# Patient Record
Sex: MALE | Race: BLACK OR AFRICAN AMERICAN | NOT HISPANIC OR LATINO | Employment: OTHER | URBAN - METROPOLITAN AREA
[De-identification: names, ages, dates, MRNs, and addresses within clinical notes are randomized per-mention and may not be internally consistent; named-entity substitution may affect disease eponyms.]

---

## 2017-04-23 ENCOUNTER — HOSPITAL ENCOUNTER (EMERGENCY)
Facility: HOSPITAL | Age: 77
Discharge: HOME | End: 2017-04-23
Payer: OTHER GOVERNMENT

## 2017-04-23 VITALS
WEIGHT: 160 LBS | DIASTOLIC BLOOD PRESSURE: 88 MMHG | TEMPERATURE: 98 F | HEART RATE: 75 BPM | SYSTOLIC BLOOD PRESSURE: 180 MMHG | BODY MASS INDEX: 23.63 KG/M2 | OXYGEN SATURATION: 98 %

## 2017-04-23 DIAGNOSIS — K59.00 CONSTIPATION: ICD-10-CM

## 2017-04-23 DIAGNOSIS — R33.9 URINARY RETENTION: Primary | ICD-10-CM

## 2017-04-23 LAB
ALBUMIN SERPL BCP-MCNC: 3.2 G/DL
ALP SERPL-CCNC: 59 U/L
ALT SERPL W/O P-5'-P-CCNC: 10 U/L
ANION GAP SERPL CALC-SCNC: 9 MMOL/L
AST SERPL-CCNC: 28 U/L
BACTERIA #/AREA URNS HPF: ABNORMAL /HPF
BASOPHILS # BLD AUTO: 0.08 K/UL
BASOPHILS NFR BLD: 1 %
BILIRUB SERPL-MCNC: 0.6 MG/DL
BILIRUB UR QL STRIP: NEGATIVE
BUN SERPL-MCNC: 24 MG/DL
CALCIUM SERPL-MCNC: 9.4 MG/DL
CHLORIDE SERPL-SCNC: 106 MMOL/L
CLARITY UR: CLEAR
CO2 SERPL-SCNC: 27 MMOL/L
COLOR UR: YELLOW
CREAT SERPL-MCNC: 1.8 MG/DL
DIFFERENTIAL METHOD: ABNORMAL
EOSINOPHIL # BLD AUTO: 0.2 K/UL
EOSINOPHIL NFR BLD: 1.8 %
ERYTHROCYTE [DISTWIDTH] IN BLOOD BY AUTOMATED COUNT: 16.5 %
EST. GFR  (AFRICAN AMERICAN): 41 ML/MIN/1.73 M^2
EST. GFR  (NON AFRICAN AMERICAN): 36 ML/MIN/1.73 M^2
GLUCOSE SERPL-MCNC: 110 MG/DL
GLUCOSE UR QL STRIP: NEGATIVE
HCT VFR BLD AUTO: 31.3 %
HGB BLD-MCNC: 9.9 G/DL
HGB UR QL STRIP: ABNORMAL
HYALINE CASTS #/AREA URNS LPF: 0 /LPF
KETONES UR QL STRIP: NEGATIVE
LEUKOCYTE ESTERASE UR QL STRIP: NEGATIVE
LYMPHOCYTES # BLD AUTO: 1.7 K/UL
LYMPHOCYTES NFR BLD: 20.5 %
MCH RBC QN AUTO: 25.9 PG
MCHC RBC AUTO-ENTMCNC: 31.6 %
MCV RBC AUTO: 82 FL
MICROSCOPIC COMMENT: ABNORMAL
MONOCYTES # BLD AUTO: 0.9 K/UL
MONOCYTES NFR BLD: 10.3 %
NEUTROPHILS # BLD AUTO: 5.5 K/UL
NEUTROPHILS NFR BLD: 66.4 %
NITRITE UR QL STRIP: NEGATIVE
PH UR STRIP: 7 [PH] (ref 5–8)
PLATELET # BLD AUTO: 242 K/UL
PMV BLD AUTO: 10.2 FL
POTASSIUM SERPL-SCNC: 4.6 MMOL/L
PROT SERPL-MCNC: 8.8 G/DL
PROT UR QL STRIP: ABNORMAL
RBC # BLD AUTO: 3.82 M/UL
RBC #/AREA URNS HPF: 6 /HPF (ref 0–4)
SODIUM SERPL-SCNC: 142 MMOL/L
SP GR UR STRIP: 1.02 (ref 1–1.03)
URN SPEC COLLECT METH UR: ABNORMAL
UROBILINOGEN UR STRIP-ACNC: NEGATIVE EU/DL
WBC # BLD AUTO: 8.25 K/UL
WBC #/AREA URNS HPF: 2 /HPF (ref 0–5)

## 2017-04-23 PROCEDURE — 81000 URINALYSIS NONAUTO W/SCOPE: CPT

## 2017-04-23 PROCEDURE — 99284 EMERGENCY DEPT VISIT MOD MDM: CPT

## 2017-04-23 PROCEDURE — 85025 COMPLETE CBC W/AUTO DIFF WBC: CPT

## 2017-04-23 PROCEDURE — 80053 COMPREHEN METABOLIC PANEL: CPT

## 2017-04-23 RX ORDER — NITROFURANTOIN 25; 75 MG/1; MG/1
100 CAPSULE ORAL 2 TIMES DAILY
Qty: 10 CAPSULE | Refills: 0 | Status: SHIPPED | OUTPATIENT
Start: 2017-04-23 | End: 2018-02-18 | Stop reason: ALTCHOICE

## 2017-04-23 RX ORDER — TAMSULOSIN HYDROCHLORIDE 0.4 MG/1
0.4 CAPSULE ORAL DAILY
Qty: 10 CAPSULE | Refills: 0 | Status: SHIPPED | OUTPATIENT
Start: 2017-04-23 | End: 2018-02-22 | Stop reason: SDUPTHER

## 2017-04-23 NOTE — ED AVS SNAPSHOT
[facility]  [address] [facility] [id number]               Ryland Ortez Jr.   2017 12:41 PM   ED    Description:  Male : 1940   Department:  [facility]            Your Care was Coordinated By:     Provider Role From To    SANDRA Ogden Physician Assistant [id number] --      Reason for Visit     Urinary Retention           Diagnoses this Visit        Comments    Urinary retention    -  Primary     Constipation           ED Disposition     ED Disposition Condition Comment    Discharge             To Do List           Follow-up Information     Follow up with urologist. Go in 1 day(s).       These Medications        Disp Refills Start End    nitrofurantoin, macrocrystal-monohydrate, (MACROBID) 100 MG capsule 10 capsule 0 2017     Take 1 capsule (100 mg total) by mouth 2 (two) times daily. - Oral    Pharmacy: [facility] [id number] [place] [address] [address] AT [facility] Ph #: [phone]       tamsulosin (FLOMAX) 0.4 mg Cp24 10 capsule 0 2017    Take 1 capsule (0.4 mg total) by mouth once daily. - Oral    [facility] [place] [place] [id number] [place] [address] [address] AT [facility] Ph #: [phone]         [facility]     [facility] Care [facility] - 24/ Assistance  Unless otherwise directed by your provider, please contact [facility], our nurse care line that is available for 24/ assistance.     Registered nurses in the [facility] provide: appointment scheduling, clinical advisement, health education, and other advisory services.  Call: [phone] (toll free)               Medications           Message regarding Medications     Verify the changes and/or additions to your medication regime listed below are the same as discussed with your clinician today.  If any of these changes or additions are incorrect,  please notify your healthcare provider.        START taking these NEW medications        Refills    nitrofurantoin, macrocrystal-monohydrate, (MACROBID) 100 MG capsule 0    Sig: Take 1 capsule (100 mg total) by mouth 2 (two) times daily.    Class: Print    Route: Oral    tamsulosin (FLOMAX) 0.4 mg Cp24 0    Sig: Take 1 capsule (0.4 mg total) by mouth once daily.    Class: Normal    Route: Oral           Verify that the below list of medications is an accurate representation of the medications you are currently taking.  If none reported, the list may be blank. If incorrect, please contact your healthcare provider. Carry this list with you in case of emergency.           Current Medications     adalimumab (HUMIRA) 40 mg/0.8 mL injection Inject 0.8 mLs (40 mg total) into the skin every 7 days.    amlodipine (NORVASC) 5 MG tablet Take 5 mg by mouth once daily.    atorvastatin (LIPITOR) 10 MG tablet Take 1 tablet (10 mg total) by mouth once daily.    ferrous sulfate 325 mg (65 mg iron) Tab tablet Take 1 tablet (325 mg total) by mouth once daily.    glipiZIDE (GLUCOTROL) 5 MG tablet Take 5 mg by mouth daily with dinner or evening meal. Pt takes 2.5 mg at night    isosorbide dinitrate (ISORDIL) 40 MG Tab Take 20 mg by mouth 3 (three) times daily.    lisinopril (PRINIVIL,ZESTRIL) 40 MG tablet Take 40 mg by mouth once daily.    mesalamine (CANASA) 1000 MG Supp Place 500 mg rectally 2 (two) times daily.    metoprolol succinate (TOPROL-XL) 25 MG 24 hr tablet Take 0.5 tablets (12.5 mg total) by mouth once daily.    nitrofurantoin, macrocrystal-monohydrate, (MACROBID) 100 MG capsule Take 1 capsule (100 mg total) by mouth 2 (two) times daily.    oxybutynin (DITROPAN XL) 15 MG TR24 Take 10 mg by mouth once daily.     oxycodone-acetaminophen (PERCOCET) 5-325 mg per tablet Take 1 tablet by mouth every 4 (four) hours as needed for Pain.    pantoprazole (PROTONIX) 40 MG tablet Take 1 tablet (40 mg total) by mouth once daily.     tamsulosin (FLOMAX) 0.4 mg Cp24 Take 1 capsule (0.4 mg total) by mouth once daily.           Clinical Reference Information           Your Vitals Were     BP Pulse Temp Weight SpO2 BMI    180/88 (BP Location: Right arm, Patient Position: Lying, BP Method: Automatic) 75 98 °F (36.7 °C) (Oral) 72.6 kg (160 lb) 98% 23.63 kg/m2      Allergies as of 4/23/2017     No Known Allergies      Immunizations Administered on Date of Encounter - 4/23/2017     None      ED Micro, Lab, POCT     Start Ordered       Status Ordering Provider    04/23/17 1301 04/23/17 1300  Urinalysis Catheterized  STAT      Final result     04/23/17 1300 04/23/17 1300  CBC auto differential  STAT      Final result     04/23/17 1300 04/23/17 1300  Comprehensive metabolic panel  STAT      Final result     04/23/17 1300 04/23/17 1300  Urinalysis Microscopic  Once      Final result       ED Imaging Orders     Start Ordered       Status Ordering Provider    04/23/17 1311 04/23/17 1310  X-Ray Abdomen Flat And Erect  1 time imaging      Final result         Discharge Instructions           Urinary Retention (Male)  Urinary retention is the medical term for difficulty or inability to pass urine, even though your bladder is full.  Causes  The most common cause of urinary retention in men is the bladder outlet being blocked. This can be due to an enlarged prostate gland or a bladder infection. Certain medicines can also cause this problem. This condition is more likely to occur as men get older.    This condition is treated by insertion of a catheter into the bladder to drain the urine. This provides immediate relief. The catheter may need to remain in place for a few days to prevent a recurrence. The catheter has a balloon on the tip which was inflated after insertion. This prevents the catheter from falling out.  Symptoms  Common symptoms of urinary retention include:  · Pain (not experienced by everyone)  · Frequent urination  · Feeling that the bladder is  still full after urinating  · Incontinence (not being able to control the release of urine)  · Swollen abdomen  Treatment  This condition is treated by inserting a tube (catheter) into the bladder to drain the urine. This provides immediate relief. The catheter may need to stay in place for a few days. The catheter has a balloon on the tip, which is inflated after insertion. This prevents the catheter from falling out.  Home care  · If you were given antibiotics, take them until they are used up, or your healthcare provider tells you to stop. It is important to finish the antibiotics even though you feel better. This is to make sure your infection has cleared.  · If a catheter was left in place, it is important to keep bacteria from getting into the collection bag. Do not disconnect the catheter from the collection bag.  · Use a leg band to secure the drainage tube, so it does not pull on the catheter. Drain the collection bag when it becomes full using the drain spout at the bottom of the bag.  · Do not pull on or try to remove your catheter. This will injure your urethra. The catheter must be removed by a healthcare provider.  Follow-up care  Follow up with your healthcare provider, or as advised.  If a catheter was left in place, it can usually be removed within 3 to 7 days. Some conditions require the catheter to stay in longer. Your healthcare provider will tell you when to return to have the catheter removed.  When to seek medical advice  Call your healthcare provider right away if any of these occur:  · Fever of 100.4ºF (38ºC) or higher, or as directed by your healthcare provider  · Bladder or lower-abdominal pain or fullness  · Abdominal swelling, nausea, vomiting, or back pain  · Blood or urine leakage around the catheter  · Bloody urine coming from the catheter (if a new symptom)  · Weakness, dizziness, or fainting  · Confusion or change in usual level of alertness  · If a catheter was left in place,  return if:  ¨ Catheter falls out  ¨ Catheter stops draining for 6 hours  Date Last Reviewed: 7/26/2015  © 5774-2290 [employer]. [address] [place] [address], [place] [address]. All rights reserved. This information is not intended as a substitute for professional medical care. Always follow your healthcare professional's instructions.          Ramirez Catheter Care    A Ramirez catheter is a rubber tube that is placed through the urethra (opening where urine comes out) and into the bladder. This helps drain urine from the bladder. There is a small balloon on the end of the tube that is inflated after insertion. This keeps the catheter from sliding out of the bladder.  A Ramirez catheter is used to treat urinary retention (unable to pass urine). It is also used when there is incontinence (loss of bladder control).  Home care  · Finish taking any prescribed antibiotic even if you are feeling better before then.  · It is important to keep bacteria from getting into the collection bag. Do not disconnect the catheter from the collection bag.  · Use a leg band to secure the drainage tube, so it does not pull on the catheter. Drain the collection bag when it becomes full using the drain spout at the bottom of the bag.  · Do not try to pull or remove your catheter. This will injure your urethra. It must be removed by your healthcare provider or nurse.  Follow-up care  Follow up with your healthcare provider as advised for repeat urine testing and catheter removal or replacement.  When to seek medical advice  Call your healthcare provider right away if any of these occur:  · Fever of 100.4ºF (38ºC) or higher, or as directed by your healthcare provider  · Bladder pain or fullness  · Abdominal swelling, nausea or vomiting, or back pain  · Blood or urine leakage around the catheter  · Bloody urine coming from the catheter (if a new symptom)  · Catheter falls out  · Catheter stops draining for 6 hours  · Weakness, dizziness,  or fainting  Date Last Reviewed: 10/1/2016  © 9069-3329 [employer]. [address] [place] [address], [place] [address]. All rights reserved. This information is not intended as a substitute for professional medical care. Always follow your healthcare professional's instructions.          [facility] Sign-Up     Activating your [facility] account is as easy as 1-2-3!     1) Visit [email or web address], select Sign Up Now, enter this activation code and your date of birth, then select Next.  [email or web address]  Expires: 6/7/2017  4:14 PM      2) Create a username and password to use when you visit [facility] in the future and select a security question in case you lose your password and select Next.    3) Enter your e-mail address and click Sign Up!    Additional Information  If you have questions, please e-mail [email or web address] or call [phone] to talk to our [facility] staff. Remember, [facility] is NOT to be used for urgent needs. For medical emergencies, dial 911.         Smoking Cessation     If you would like to quit smoking:   You may be eligible for free services if you are a [place] resident and started smoking cigarettes before September 1, 1988.  Call the Smoking Cessation Trust (SCT) toll free at (231) 761-4524 or (349) 039-6895.   Call 1-800-QUIT-NOW if you do not meet the above criteria.   Contact us via email: [email or web address]   View our website for more information: [email or web address]         [facility] complies with applicable Federal civil rights laws and does not discriminate on the basis of race, color, national origin, age, disability, or sex.        Language Assistance Services     ATTENTION: Language assistance services are available, free of charge. Please call [phone].      ATENCIÓN: Si habla español, tiene a neal disposición servicios gratuitos de asistencia lingüística. Llame al [phone].     CHÚ Ý: N?u b?n nói Ti?ng Vi?t, có các d?ch v? h?  tr? venessa ribera? mi?n phí dành cho b?n. G?i s? [phone].

## 2017-04-23 NOTE — ED PROVIDER NOTES
Encounter Date: 4/23/2017       History     Chief Complaint   Patient presents with    Urinary Retention     post colonoscopy     Review of patient's allergies indicates:  No Known Allergies  HPI Comments: Pt states that he has been unable to urinate x 12 hours.  Pt denies back pain, saddle anesthesia, focal weakness.    Patient is a 77 y.o. male presenting with the following complaint: male genitourinary complaint. The history is provided by the patient.   Male  Problem   Primary symptoms include no dysuria, no penile pain. Primary symptoms comment: urinary retention. This is a recurrent problem. The current episode started today. The problem occurs occasionally. The problem has been unchanged. The symptoms occur spontaneously. Associated symptoms include abdominal pain and constipation. Pertinent negatives include no anorexia, no diaphoresis, no nausea, no vomiting, no abdominal swelling, no frequency and no diarrhea.     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    Overactive bladder     Peripheral vascular disease 1/20/2016    Prediabetes     PVD (peripheral vascular disease) 7/8/2013    Stroke      Past Surgical History:   Procedure Laterality Date    ARTERIAL ANEURYSM REPAIR      AAA repair    CAROTID ARTERY ANGIOPLASTY      COLONOSCOPY N/A 10/9/2015    Procedure: COLONOSCOPY;  Surgeon: Dedrick Brownlee MD;  Location: [facility];  Service: Endoscopy;  Laterality: N/A;    COLONOSCOPY N/A 11/24/2015    Procedure: COLONOSCOPY;  Surgeon: Dedrick Borwnlee MD;  Location: [facility];   Service: Endoscopy;  Laterality: N/A;    CORONARY ANGIOPLASTY WITH STENT PLACEMENT      2010?    ROTATOR CUFF REPAIR      Right     Family History   Problem Relation Age of Onset    Hypertension Mother     Diabetes Mother     No Known Problems Father     Colon cancer Neg Hx     Stomach cancer Neg Hx      Social History   Substance Use Topics    Smoking status: Former Smoker     Packs/day: 1.00     Years: 40.00     Quit date: 7/18/2008    Smokeless tobacco: None    Alcohol use No     Review of Systems   Constitutional: Negative for diaphoresis and fever.   HENT: Negative for sore throat.    Eyes: Negative for photophobia and redness.   Respiratory: Negative for shortness of breath.    Cardiovascular: Negative for chest pain.   Gastrointestinal: Positive for abdominal pain and constipation. Negative for anorexia, diarrhea, nausea and vomiting.   Endocrine: Negative for polydipsia and polyphagia.   Genitourinary: Positive for difficulty urinating. Negative for decreased urine volume, discharge, dysuria, enuresis, flank pain, frequency, genital sores, hematuria, penile pain, penile swelling, scrotal swelling, testicular pain and urgency.   Musculoskeletal: Negative for back pain.   Skin: Negative for rash.   Neurological: Negative for weakness and numbness.   Hematological: Does not bruise/bleed easily.   Psychiatric/Behavioral: The patient is not nervous/anxious.    All other systems reviewed and are negative.      Physical Exam   Initial Vitals   BP Pulse Resp Temp SpO2   04/23/17 1250 04/23/17 1250 -- 04/23/17 1250 04/23/17 1250   180/88 75  98 °F (36.7 °C) 98 %     Physical Exam    Nursing note and vitals reviewed.  Constitutional: He appears well-developed and well-nourished.   HENT:   Head: Normocephalic and atraumatic.   Right Ear: External ear normal.   Left Ear: External ear normal.   Nose: Nose normal.   Mouth/Throat: Oropharynx is clear and moist.   Eyes: Conjunctivae and EOM are normal. Pupils are  equal, round, and reactive to light.   Neck: Normal range of motion. Neck supple.   Cardiovascular: Normal rate, regular rhythm, normal heart sounds and intact distal pulses.   Pulmonary/Chest: Breath sounds normal. No respiratory distress. He has no wheezes. He has no rhonchi. He has no rales.   Abdominal: Soft. Bowel sounds are normal. He exhibits no distension. There is no tenderness. There is no rebound and no guarding.   Distended suprapubic region     Musculoskeletal: Normal range of motion.   Neurological: He is alert and oriented to person, place, and time. He has normal strength.   Skin: Skin is warm and dry.   Psychiatric: He has a normal mood and affect. His behavior is normal. Judgment and thought content normal.         ED Course   Procedures  Labs Reviewed   CBC W/ AUTO DIFFERENTIAL - Abnormal; Notable for the following:        Result Value    RBC 3.82 (*)     Hemoglobin 9.9 (*)     Hematocrit 31.3 (*)     MCH 25.9 (*)     MCHC 31.6 (*)     RDW 16.5 (*)     All other components within normal limits   COMPREHENSIVE METABOLIC PANEL - Abnormal; Notable for the following:     BUN, Bld 24 (*)     Creatinine 1.8 (*)     Total Protein 8.8 (*)     Albumin 3.2 (*)     eGFR if  41 (*)     eGFR if non  36 (*)     All other components within normal limits   URINALYSIS - Abnormal; Notable for the following:     Protein, UA 2+ (*)     Occult Blood UA 2+ (*)     All other components within normal limits   URINALYSIS MICROSCOPIC - Abnormal; Notable for the following:     RBC, UA 6 (*)     All other components within normal limits                               ED Course     Clinical Impression:   The primary encounter diagnosis was Urinary retention. A diagnosis of Constipation was also pertinent to this visit.    Disposition:   Disposition: Discharged  Condition: Stable       SANDRA Ogden  04/23/17 0110

## 2017-04-23 NOTE — DISCHARGE INSTRUCTIONS
Urinary Retention (Male)  Urinary retention is the medical term for difficulty or inability to pass urine, even though your bladder is full.  Causes  The most common cause of urinary retention in men is the bladder outlet being blocked. This can be due to an enlarged prostate gland or a bladder infection. Certain medicines can also cause this problem. This condition is more likely to occur as men get older.    This condition is treated by insertion of a catheter into the bladder to drain the urine. This provides immediate relief. The catheter may need to remain in place for a few days to prevent a recurrence. The catheter has a balloon on the tip which was inflated after insertion. This prevents the catheter from falling out.  Symptoms  Common symptoms of urinary retention include:  · Pain (not experienced by everyone)  · Frequent urination  · Feeling that the bladder is still full after urinating  · Incontinence (not being able to control the release of urine)  · Swollen abdomen  Treatment  This condition is treated by inserting a tube (catheter) into the bladder to drain the urine. This provides immediate relief. The catheter may need to stay in place for a few days. The catheter has a balloon on the tip, which is inflated after insertion. This prevents the catheter from falling out.  Home care  · If you were given antibiotics, take them until they are used up, or your healthcare provider tells you to stop. It is important to finish the antibiotics even though you feel better. This is to make sure your infection has cleared.  · If a catheter was left in place, it is important to keep bacteria from getting into the collection bag. Do not disconnect the catheter from the collection bag.  · Use a leg band to secure the drainage tube, so it does not pull on the catheter. Drain the collection bag when it becomes full using the drain spout at the bottom of the bag.  · Do not pull on or try to remove your catheter.  This will injure your urethra. The catheter must be removed by a healthcare provider.  Follow-up care  Follow up with your healthcare provider, or as advised.  If a catheter was left in place, it can usually be removed within 3 to 7 days. Some conditions require the catheter to stay in longer. Your healthcare provider will tell you when to return to have the catheter removed.  When to seek medical advice  Call your healthcare provider right away if any of these occur:  · Fever of 100.4ºF (38ºC) or higher, or as directed by your healthcare provider  · Bladder or lower-abdominal pain or fullness  · Abdominal swelling, nausea, vomiting, or back pain  · Blood or urine leakage around the catheter  · Bloody urine coming from the catheter (if a new symptom)  · Weakness, dizziness, or fainting  · Confusion or change in usual level of alertness  · If a catheter was left in place, return if:  ¨ Catheter falls out  ¨ Catheter stops draining for 6 hours  Date Last Reviewed: 7/26/2015  © 0546-6936 The [employer]. [address] [place] [address]. All rights reserved. This information is not intended as a substitute for professional medical care. Always follow your healthcare professional's instructions.          Ramirez Catheter Care    A Ramirez catheter is a rubber tube that is placed through the urethra (opening where urine comes out) and into the bladder. This helps drain urine from the bladder. There is a small balloon on the end of the tube that is inflated after insertion. This keeps the catheter from sliding out of the bladder.  A Ramirez catheter is used to treat urinary retention (unable to pass urine). It is also used when there is incontinence (loss of bladder control).  Home care  · Finish taking any prescribed antibiotic even if you are feeling better before then.  · It is important to keep bacteria from getting into the collection bag. Do not disconnect the catheter from the collection bag.  · Use a  leg band to secure the drainage tube, so it does not pull on the catheter. Drain the collection bag when it becomes full using the drain spout at the bottom of the bag.  · Do not try to pull or remove your catheter. This will injure your urethra. It must be removed by your healthcare provider or nurse.  Follow-up care  Follow up with your healthcare provider as advised for repeat urine testing and catheter removal or replacement.  When to seek medical advice  Call your healthcare provider right away if any of these occur:  · Fever of 100.4ºF (38ºC) or higher, or as directed by your healthcare provider  · Bladder pain or fullness  · Abdominal swelling, nausea or vomiting, or back pain  · Blood or urine leakage around the catheter  · Bloody urine coming from the catheter (if a new symptom)  · Catheter falls out  · Catheter stops draining for 6 hours  · Weakness, dizziness, or fainting  Date Last Reviewed: 10/1/2016  © 7711-1290 [employer]. [address] [place] [address], [place] [address]. All rights reserved. This information is not intended as a substitute for professional medical care. Always follow your healthcare professional's instructions.

## 2018-02-18 ENCOUNTER — HOSPITAL ENCOUNTER (EMERGENCY)
Facility: HOSPITAL | Age: 78
Discharge: HOME | End: 2018-02-18
Attending: EMERGENCY MEDICINE
Payer: OTHER GOVERNMENT

## 2018-02-18 VITALS
OXYGEN SATURATION: 97 % | DIASTOLIC BLOOD PRESSURE: 105 MMHG | SYSTOLIC BLOOD PRESSURE: 174 MMHG | HEIGHT: 69 IN | BODY MASS INDEX: 23.7 KG/M2 | RESPIRATION RATE: 19 BRPM | WEIGHT: 160 LBS | HEART RATE: 90 BPM | TEMPERATURE: 99 F

## 2018-02-18 DIAGNOSIS — N30.01 ACUTE CYSTITIS WITH HEMATURIA: ICD-10-CM

## 2018-02-18 DIAGNOSIS — R33.9 URINARY RETENTION: Primary | ICD-10-CM

## 2018-02-18 LAB
BACTERIA #/AREA URNS HPF: ABNORMAL /HPF
BILIRUB UR QL STRIP: ABNORMAL
CLARITY UR: ABNORMAL
COLOR UR: ABNORMAL
GLUCOSE UR QL STRIP: ABNORMAL
HGB UR QL STRIP: ABNORMAL
HYALINE CASTS #/AREA URNS LPF: 0 /LPF
KETONES UR QL STRIP: ABNORMAL
LEUKOCYTE ESTERASE UR QL STRIP: ABNORMAL
MICROSCOPIC COMMENT: ABNORMAL
NITRITE UR QL STRIP: ABNORMAL
PH UR STRIP: ABNORMAL [PH] (ref 5–8)
PROT UR QL STRIP: ABNORMAL
RBC #/AREA URNS HPF: >100 /HPF (ref 0–4)
SP GR UR STRIP: ABNORMAL (ref 1–1.03)
SQUAMOUS #/AREA URNS HPF: 0 /HPF
URN SPEC COLLECT METH UR: ABNORMAL
UROBILINOGEN UR STRIP-ACNC: ABNORMAL EU/DL
WBC #/AREA URNS HPF: >100 /HPF (ref 0–5)

## 2018-02-18 PROCEDURE — 87077 CULTURE AEROBIC IDENTIFY: CPT

## 2018-02-18 PROCEDURE — 99284 EMERGENCY DEPT VISIT MOD MDM: CPT | Mod: 25

## 2018-02-18 PROCEDURE — 87088 URINE BACTERIA CULTURE: CPT

## 2018-02-18 PROCEDURE — 25000003 PHARM REV CODE 250: Performed by: EMERGENCY MEDICINE

## 2018-02-18 PROCEDURE — 87086 URINE CULTURE/COLONY COUNT: CPT

## 2018-02-18 PROCEDURE — 87186 SC STD MICRODIL/AGAR DIL: CPT

## 2018-02-18 PROCEDURE — 81000 URINALYSIS NONAUTO W/SCOPE: CPT

## 2018-02-18 PROCEDURE — 51702 INSERT TEMP BLADDER CATH: CPT

## 2018-02-18 RX ORDER — CLONIDINE HYDROCHLORIDE 0.1 MG/1
0.1 TABLET ORAL
Status: COMPLETED | OUTPATIENT
Start: 2018-02-18 | End: 2018-02-18

## 2018-02-18 RX ORDER — CLOTRIMAZOLE 1 %
CREAM (GRAM) TOPICAL
Qty: 15 G | Refills: 0 | Status: SHIPPED | OUTPATIENT
Start: 2018-02-18

## 2018-02-18 RX ORDER — CEFUROXIME AXETIL 500 MG/1
500 TABLET ORAL 2 TIMES DAILY
Qty: 20 TABLET | Refills: 0 | Status: SHIPPED | OUTPATIENT
Start: 2018-02-18 | End: 2018-02-18 | Stop reason: ALTCHOICE

## 2018-02-18 RX ORDER — CLOTRIMAZOLE 1 %
CREAM (GRAM) TOPICAL
Qty: 15 G | Refills: 0 | Status: SHIPPED | OUTPATIENT
Start: 2018-02-18 | End: 2018-02-18

## 2018-02-18 RX ADMIN — CLONIDINE HYDROCHLORIDE 0.1 MG: 0.1 TABLET ORAL at 10:02

## 2018-02-18 NOTE — ED NOTES
Level of Consciousness: The patient is awake, alert, and oriented with appropriate affect and speech; oriented to person, place and time.  Appearance: Sitting up in bed with no acute distress noted. Clothing and hygiene are clean and worn appropriately. Pt smells of urine.   Skin: Skin is warm and dry with good skin turgor; intact; color consistent with ethnicity.  Mucous membranes are moist.   Musculoskeletal: Moves all extremities well in full range of motion. Generalized weakness.   Respiratory: Airway open and patent, respirations spontaneous, even and unlabored. No accessory muscles in use.   Cardiac: Regular rate and rhythm, good pulses palpated peripherally. Feet are dry and ashy.   Abdomen: Soft, non-tender to palpation. No distention noted. Pt wearing brief and smells of urine. States has not urinated since yesterday.   Neurologic: PERRLA, face exhibits symmetrical expression, hand grasps equal and even bilaterally, normal sensation noted to all extremities and face when touched by a finger.

## 2018-02-18 NOTE — ED PROVIDER NOTES
SCRIBE #1 NOTE: I, Harry Kay, am scribing for, and in the presence of, Cristian Rea MD. I have scribed the entire note.      History      Chief Complaint   Patient presents with    Urinary Retention     pt states he is unable urinate this happened about a year ago       Review of patient's allergies indicates:  No Known Allergies     HPI   HPI    2/18/2018, 8:49 AM   History obtained from the patient      History of Present Illness: Ryland Ortez Jr. is a 78 y.o. male patient who presents to the Emergency Department for an evaluation of urinary retention which onset suddenly last night. Pt reports he had a similar episode x1 year ago with relief after a washington catheter was place. Pt also reports he is on Flomax. Symptoms are constant and moderate in severity. Exacerbated by nothing and relieved by nothing. Patient denies any fever, chills, abd pain, N/V, dysuria, hematuria, HA, weakness, and all other sxs at this time. No further complaints or concerns at this time.       Arrival mode: Personal vehicle    PCP: Janina Emerson MD       Past Medical History:  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    Overactive bladder     Peripheral vascular disease 1/20/2016    Prediabetes     PVD (peripheral vascular disease) 7/8/2013    Stroke        Past Surgical History:  Past Surgical History:   Procedure Laterality Date    ARTERIAL ANEURYSM REPAIR      AAA repair    CAROTID ARTERY ANGIOPLASTY      COLONOSCOPY N/A 10/9/2015    Procedure: COLONOSCOPY;  Surgeon: Dedrick Brownlee MD;   Location: [facility];  Service: Endoscopy;  Laterality: N/A;    COLONOSCOPY N/A 11/24/2015    Procedure: COLONOSCOPY;  Surgeon: Dedrick Brownlee MD;  Location: [facility];  Service: Endoscopy;  Laterality: N/A;    CORONARY ANGIOPLASTY WITH STENT PLACEMENT      2010?    ROTATOR CUFF REPAIR      Right         Family History:  Family History   Problem Relation Age of Onset    Hypertension Mother     Diabetes Mother     No Known Problems Father     Colon cancer Neg Hx     Stomach cancer Neg Hx        Social History:  Social History     Social History Main Topics    Smoking status: Former Smoker     Packs/day: 1.00     Years: 40.00     Quit date: 7/18/2008    Smokeless tobacco: Not on file    Alcohol use No    Drug use: No    Sexual activity: Unknown       ROS   Review of Systems   Constitutional: Negative for chills and fever.   HENT: Negative for congestion and sore throat.    Respiratory: Negative for cough and shortness of breath.    Cardiovascular: Negative for chest pain.   Gastrointestinal: Negative for nausea and vomiting.   Genitourinary: Positive for decreased urine volume. Negative for dysuria, flank pain, frequency, hematuria and urgency.   Musculoskeletal: Negative for back pain.   Skin: Negative for rash.   Neurological: Negative for weakness and headaches.   Hematological: Does not bruise/bleed easily.     Physical Exam      Initial Vitals [02/18/18 0847]   BP Pulse Resp Temp SpO2   (!) 169/99 84 18 98 °F (36.7 °C) 98 %      MAP       122.33          Physical Exam  Nursing Notes and Vital Signs Reviewed.  Constitutional: Patient is in mild acute distress. Well-developed and well-nourished.  Head: Atraumatic. Normocephalic.  Eyes: PERRL. EOM intact. Conjunctivae are not pale. No scleral icterus.  ENT: Mucous membranes are moist. Oropharynx is clear and symmetric.    Neck: Supple. Full ROM. No lymphadenopathy.  Cardiovascular: Regular rate. Regular rhythm.  Pulmonary/Chest: No respiratory  "distress.   Abdominal: Soft and non-distended.  There is no tenderness.  Musculoskeletal: Moves all extremities. No obvious deformities. No edema. No calf tenderness.  Skin: Warm and dry.  Neurological:  Alert, awake, and appropriate.  Normal speech.  No acute focal neurological deficits are appreciated.  Psychiatric: Normal affect. Good eye contact. Appropriate in content.    ED Course    Procedures  ED Vital Signs:  Vitals:    02/18/18 0847 02/18/18 1013   BP: (!) 169/99 (!) 183/114   Pulse: 84    Resp: 18    Temp: 98 °F (36.7 °C)    TempSrc: Oral    SpO2: 98%    Weight: 72.6 kg (160 lb)    Height: 5' 9" (1.753 m)        Abnormal Lab Results:  Labs Reviewed   URINALYSIS - Abnormal; Notable for the following:        Result Value    Color, UA Brown (*)     Appearance, UA Cloudy (*)     All other components within normal limits   URINALYSIS MICROSCOPIC - Abnormal; Notable for the following:     RBC, UA >100 (*)     WBC, UA >100 (*)     Bacteria, UA Many (*)     All other components within normal limits   CULTURE, URINE   CULTURE, URINE        All Lab Results:  Results for orders placed or performed during the hospital encounter of 02/18/18   Urinalysis   Result Value Ref Range    Specimen UA Urine, Clean Catch     Color, UA Brown (A) Yellow, Straw, Casie    Appearance, UA Cloudy (A) Clear    pH, UA SEE COMMENT 5.0 - 8.0    Specific Gravity, UA SEE COMMENT 1.005 - 1.030    Protein, UA SEE COMMENT Negative    Glucose, UA SEE COMMENT Negative    Ketones, UA SEE COMMENT Negative    Bilirubin (UA) SEE COMMENT Negative    Occult Blood UA SEE COMMENT Negative    Nitrite, UA SEE COMMENT Negative    Urobilinogen, UA SEE COMMENT <2.0 EU/dL    Leukocytes, UA SEE COMMENT Negative   Urinalysis Microscopic   Result Value Ref Range    RBC, UA >100 (H) 0 - 4 /hpf    WBC, UA >100 (H) 0 - 5 /hpf    Bacteria, UA Many (A) None-Occ /hpf    Squam Epithel, UA 0 /hpf    Hyaline Casts, UA 0 0-1/lpf /lpf    Microscopic Comment SEE COMMENT  "            The Emergency Provider reviewed the vital signs and test results, which are outlined above.    ED Discussion     9:44 AM: Reassessed pt at this time. Pt is awake, alert, and in NAD. Pt states his condition has improved at this time. Discussed with pt all pertinent ED information and results. Discussed pt dx and plan of tx. Gave pt all f/u and return to the ED instructions. All questions and concerns were addressed at this time. Pt expresses understanding of information and instructions, and is comfortable with plan to discharge. Pt is stable for discharge.    I discussed with patient and/or family/caretaker that evaluation in the ED does not suggest any emergent or life threatening medical conditions requiring immediate intervention beyond what was provided in the ED, and I believe patient is safe for discharge.  Regardless, an unremarkable evaluation in the ED does not preclude the development or presence of a serious of life threatening condition. As such, patient was instructed to return immediately for any worsening or change in current symptoms.      ED Medication(s):  Medications   cloNIDine tablet 0.1 mg (0.1 mg Oral Given 2/18/18 1013)       New Prescriptions    CEFUROXIME (CEFTIN) 500 MG TABLET    Take 1 tablet (500 mg total) by mouth 2 (two) times daily.    CLOTRIMAZOLE (LOTRIMIN) 1 % CREAM    Apply to affected area 2 times daily       Follow-up Information     Janina Emerson MD In 2 days.    Specialty:  Nephrology  Contact information:  [address] [facility] [address] [phone]                     Medical Decision Making    Medical Decision Making:   Clinical Tests:   Lab Tests: Ordered and Reviewed           Scribe Attestation:   Scribe #1: I performed the above scribed service and the documentation accurately describes the services I performed. I attest to the accuracy of the note.    Attending:   Physician Attestation Statement for Scribe #1: Cristian WHITEHEAD  MD Álvaro, personally performed the services described in this documentation, as scribed by Harry Kay, in my presence, and it is both accurate and complete.          Clinical Impression       ICD-10-CM ICD-9-CM   1. Urinary retention R33.9 788.20   2. Acute cystitis with hematuria N30.01 595.0       Disposition:   Disposition: Discharged  Condition: Stable         Cristian Rea MD  02/18/18 1026

## 2018-02-19 ENCOUNTER — TELEPHONE (OUTPATIENT)
Dept: UROLOGY | Facility: CLINIC | Age: 78
End: 2018-02-19

## 2018-02-20 ENCOUNTER — TELEPHONE (OUTPATIENT)
Dept: EMERGENCY MEDICINE | Facility: HOSPITAL | Age: 78
End: 2018-02-20

## 2018-02-20 LAB — BACTERIA UR CULT: NORMAL

## 2018-02-20 NOTE — TELEPHONE ENCOUNTER
Spoke with Mr. Ortez about the need to change antibiotics to bactrim due to culture results. Called new abx into [facility] on [address].

## 2018-02-20 NOTE — TELEPHONE ENCOUNTER
----- Message from Cristian Rea MD sent at 2/20/2018 11:42 AM CST -----  Patient on Cipro.  UTI resistant to cipro.  Please call in Bactrim DS.  1 po bid for 7 days.

## 2018-02-22 ENCOUNTER — OFFICE VISIT (OUTPATIENT)
Dept: UROLOGY | Facility: CLINIC | Age: 78
End: 2018-02-22
Payer: OTHER GOVERNMENT

## 2018-02-22 ENCOUNTER — TELEPHONE (OUTPATIENT)
Dept: EMERGENCY MEDICINE | Facility: HOSPITAL | Age: 78
End: 2018-02-22

## 2018-02-22 VITALS
HEIGHT: 69 IN | BODY MASS INDEX: 23.7 KG/M2 | WEIGHT: 160 LBS | HEART RATE: 74 BPM | DIASTOLIC BLOOD PRESSURE: 96 MMHG | SYSTOLIC BLOOD PRESSURE: 150 MMHG

## 2018-02-22 DIAGNOSIS — N40.0 BENIGN PROSTATIC HYPERPLASIA, UNSPECIFIED WHETHER LOWER URINARY TRACT SYMPTOMS PRESENT: Primary | ICD-10-CM

## 2018-02-22 DIAGNOSIS — R33.9 URINARY RETENTION: ICD-10-CM

## 2018-02-22 PROCEDURE — 3008F BODY MASS INDEX DOCD: CPT | Mod: ,,, | Performed by: UROLOGY

## 2018-02-22 PROCEDURE — 1159F MED LIST DOCD IN RCRD: CPT | Mod: ,,, | Performed by: UROLOGY

## 2018-02-22 PROCEDURE — 99212 OFFICE O/P EST SF 10 MIN: CPT | Mod: PBBFAC | Performed by: UROLOGY

## 2018-02-22 PROCEDURE — 1126F AMNT PAIN NOTED NONE PRSNT: CPT | Mod: ,,, | Performed by: UROLOGY

## 2018-02-22 PROCEDURE — 99999 PR PBB SHADOW E&M-EST. PATIENT-LVL II: CPT | Mod: PBBFAC,,, | Performed by: UROLOGY

## 2018-02-22 PROCEDURE — 99204 OFFICE O/P NEW MOD 45 MIN: CPT | Mod: S$PBB,,, | Performed by: UROLOGY

## 2018-02-22 RX ORDER — TAMSULOSIN HYDROCHLORIDE 0.4 MG/1
0.4 CAPSULE ORAL DAILY
Qty: 30 CAPSULE | Refills: 11 | Status: SHIPPED | OUTPATIENT
Start: 2018-02-22 | End: 2019-02-22

## 2018-02-22 NOTE — PROGRESS NOTES
Chief Complaint: Urinary Retention    HPI:   2/22/18: 77 yo man went to the ER earlier this week couldn't urinate but a few drops overnight.  A washington was put in and he says not much came out.  Quarter bag though. No abd/pelvic pain and no exac/rel factors.  No hematuria.  No urolithiasis.  Has been taking flomax for a long time.  Takes oxybutinin can't say how long.  No  history.    Allergies:  Patient has no known allergies.    Medications:  has a current medication list which includes the following prescription(s): adalimumab, amlodipine, atorvastatin, ciprofloxacin hcl, clotrimazole, ferrous sulfate, glipizide, isosorbide dinitrate, lisinopril, mesalamine, metoprolol succinate, oxybutynin, oxycodone-acetaminophen, tamsulosin, and pantoprazole.    Review of Systems:  General: No fever, chills, fatigability, or weight loss.  Skin: No rashes, itching, or changes in color or texture of skin.  Chest: Denies LÓPEZ, cyanosis, wheezing, cough, and sputum production.  Abdomen: Appetite fine. No weight loss. Denies diarrhea, abdominal pain, hematemesis, or blood in stool.  Musculoskeletal: No joint stiffness or swelling. Denies back pain.  : As above.  All other review of systems negative.    PMH:   has a past medical history of Anticoagulant long-term use; CAD (coronary artery disease) (7/8/2013); Cerebrovascular disease; Chronic kidney disease (CKD), stage III (moderate); Crohn's disease; History of PTCA (1/20/2016); Hyperlipidemia; Hypertension; Hypertension; Lumbar stenosis; MI (myocardial infarction); NSVT (nonsustained ventricular tachycardia) (1/20/2016); Old MI (myocardial infarction) (1/20/2016); Overactive bladder; Peripheral vascular disease (1/20/2016); Prediabetes; PVD (peripheral vascular disease) (7/8/2013); and Stroke.    PSH:   has a past surgical history that includes Rotator cuff repair; Coronary angioplasty with stent; Arterial aneurysm repair; Carotid angioplasty; Colonoscopy (N/A, 10/9/2015); and  Colonoscopy (N/A, 11/24/2015).    FamHx: family history includes Diabetes in his mother; Hypertension in his mother; No Known Problems in his father.    SocHx:  reports that he quit smoking about 9 years ago. He has a 40.00 pack-year smoking history. He does not have any smokeless tobacco history on file. He reports that he does not drink alcohol or use drugs.      Physical Exam:  Vitals:    02/22/18 1551   BP: (!) 150/96   Pulse: 74     General: A&Ox3, no apparent distress, no deformities  Neck: No masses, normal thyroid  Lungs: normal inspiration, no use of accessory muscles  Heart: normal pulse, no arrhythmias  Abdomen: Soft, NT, ND, no masses, no hernias, no hepatosplenomegaly  Lymphatic: Neck and groin nodes negative  Skin: The skin is warm and dry. No jaundice.  Ext: No c/c/e.  : Test desc svetlana, no abnormalities of epididymus. Penis normal, with normal penile and scrotal skin. Meatus normal.     Labs/Studies:     Impression/Plan:   1. RTC for cysto/uroflow.  Stop oxybutinin. Stay on flomax.

## 2018-02-22 NOTE — TELEPHONE ENCOUNTER
----- Message from Eduard Salgado MD sent at 2/20/2018  8:33 PM CST -----  Please call the patient regarding his abnormal result. Pt needs to change antibiotics secondary to resitance of culture to cipro. Pt should be placed on Ceftin 500 mg bid times 10 days and follow up with PCP/return to ER if sx are worse/fever.

## 2018-03-19 ENCOUNTER — OFFICE VISIT (OUTPATIENT)
Dept: UROLOGY | Facility: CLINIC | Age: 78
End: 2018-03-19
Payer: MEDICARE

## 2018-03-19 VITALS — WEIGHT: 160.06 LBS | BODY MASS INDEX: 23.71 KG/M2 | HEIGHT: 69 IN

## 2018-03-19 DIAGNOSIS — N40.0 BENIGN PROSTATIC HYPERPLASIA, UNSPECIFIED WHETHER LOWER URINARY TRACT SYMPTOMS PRESENT: Primary | ICD-10-CM

## 2018-03-19 LAB — POC RESIDUAL URINE VOLUME: 116 ML (ref 0–100)

## 2018-03-19 PROCEDURE — 99212 OFFICE O/P EST SF 10 MIN: CPT | Mod: PBBFAC,25 | Performed by: UROLOGY

## 2018-03-19 PROCEDURE — 52000 CYSTOURETHROSCOPY: CPT | Mod: PBBFAC | Performed by: UROLOGY

## 2018-03-19 PROCEDURE — 99999 PR PBB SHADOW E&M-EST. PATIENT-LVL II: CPT | Mod: PBBFAC,,, | Performed by: UROLOGY

## 2018-03-19 PROCEDURE — 96372 THER/PROPH/DIAG INJ SC/IM: CPT | Mod: PBBFAC,59

## 2018-03-19 PROCEDURE — 51798 US URINE CAPACITY MEASURE: CPT | Mod: PBBFAC | Performed by: UROLOGY

## 2018-03-19 PROCEDURE — 99499 UNLISTED E&M SERVICE: CPT | Mod: S$PBB,,, | Performed by: UROLOGY

## 2018-03-19 PROCEDURE — 52000 CYSTOURETHROSCOPY: CPT | Mod: S$PBB,,, | Performed by: UROLOGY

## 2018-03-19 RX ORDER — CEFTRIAXONE 1 G/1
1 INJECTION, POWDER, FOR SOLUTION INTRAMUSCULAR; INTRAVENOUS
Status: COMPLETED | OUTPATIENT
Start: 2018-03-19 | End: 2018-03-19

## 2018-03-19 RX ORDER — FINASTERIDE 5 MG/1
5 TABLET, FILM COATED ORAL DAILY
Qty: 30 TABLET | Refills: 11 | Status: SHIPPED | OUTPATIENT
Start: 2018-03-19 | End: 2019-03-19

## 2018-03-19 RX ADMIN — CEFTRIAXONE 1 G: 500 INJECTION, POWDER, FOR SOLUTION INTRAMUSCULAR; INTRAVENOUS at 09:03

## 2018-03-19 NOTE — PROGRESS NOTES
Chief Complaint: Urinary Retention    HPI:   3/19/18: Cysto confirms large median lobe, obstructing, amenable to TURP.  2/22/18: 79 yo man went to the ER earlier this week couldn't urinate but a few drops overnight.  A washington was put in and he says not much came out.  Quarter bag though. No abd/pelvic pain and no exac/rel factors.  No hematuria.  No urolithiasis.  Has been taking flomax for a long time.  Takes oxybutinin can't say how long.  No  history.    Allergies:  Patient has no known allergies.    Medications:  has a current medication list which includes the following prescription(s): adalimumab, amlodipine, atorvastatin, clotrimazole, ferrous sulfate, glipizide, isosorbide dinitrate, lisinopril, mesalamine, metoprolol succinate, oxycodone-acetaminophen, pantoprazole, and tamsulosin.    Review of Systems:  General: No fever, chills, fatigability, or weight loss.  Skin: No rashes, itching, or changes in color or texture of skin.  Chest: Denies LÓPEZ, cyanosis, wheezing, cough, and sputum production.  Abdomen: Appetite fine. No weight loss. Denies diarrhea, abdominal pain, hematemesis, or blood in stool.  Musculoskeletal: No joint stiffness or swelling. Denies back pain.  : As above.  All other review of systems negative.    PMH:   has a past medical history of Anticoagulant long-term use; CAD (coronary artery disease) (7/8/2013); Cerebrovascular disease; Chronic kidney disease (CKD), stage III (moderate); Crohn's disease; History of PTCA (1/20/2016); Hyperlipidemia; Hypertension; Hypertension; Lumbar stenosis; MI (myocardial infarction); NSVT (nonsustained ventricular tachycardia) (1/20/2016); Old MI (myocardial infarction) (1/20/2016); Overactive bladder; Peripheral vascular disease (1/20/2016); Prediabetes; PVD (peripheral vascular disease) (7/8/2013); and Stroke.    PSH:   has a past surgical history that includes Rotator cuff repair; Coronary angioplasty with stent; Arterial aneurysm repair; Carotid  angioplasty; Colonoscopy (N/A, 10/9/2015); and Colonoscopy (N/A, 11/24/2015).    FamHx: family history includes Diabetes in his mother; Hypertension in his mother; No Known Problems in his father.    SocHx:  reports that he quit smoking about 9 years ago. He has a 40.00 pack-year smoking history. He does not have any smokeless tobacco history on file. He reports that he does not drink alcohol or use drugs.      Physical Exam:  There were no vitals filed for this visit.  General: A&Ox3, no apparent distress, no deformities  Neck: No masses, normal thyroid  Lungs: normal inspiration, no use of accessory muscles  Heart: normal pulse, no arrhythmias  Abdomen: Soft, NT, ND  Skin: The skin is warm and dry. No jaundice.  Ext: No c/c/e.  :   2/18: Test desc svetlana, no abnormalities of epididymus. Penis normal, with normal penile and scrotal skin. Meatus normal.     Labs/Studies:     Procedure: Diagnostic Cystoscopy    Procedure in Detail: After proper consents were obtained, the patient was prepped and draped in normal sterile fashion for diagnostic cystoscopy. 5 ml of lidocaine jelly was instilled in the urethra. The flexible cystoscope was then introduced into the urethra, and advanced into the bladder under direct vision. The urethral mucosa appeared normal, and no strictures were noted. The sphincter appeared to be normal, and the veru montanum was unremarkable. The prostatic mucosa and the lateral lobes of the prostate were opposed and obstructing with very large median lobe. The bladder neck was normal. Inspection of the interior of the bladder was then carried out. The trigone was unremarkable, with no mucosal lesions. The ureteral orifices were normal in position and configuration. Systematic inspection of the mucosa of the bladder it was then carried out, rotating the cystoscope so that all areas of the left and right lateral walls, the dome of the bladder, and the posterior wall were all visualized. The cystoscope  was then advanced further into the bladder, and maximum deflection of the scope was performed so that the bladder neck could be inspected. No mucosal lesions were noted there. The cystoscope was then removed, and the procedure terminated.     Findings: median lobe with obstruction, amenable to TURP    ABx: Rocephin 1 gm IM    Impression/Plan:   1. Stay on flomax.  Add finasteride.  RTC PVR this afternoon then 1 week, otherwise RTC 1 mo.  2. PM PVR was 115 ml.

## 2018-03-20 ENCOUNTER — CLINICAL SUPPORT (OUTPATIENT)
Dept: UROLOGY | Facility: CLINIC | Age: 78
End: 2018-03-20
Payer: MEDICARE

## 2018-03-20 VITALS — BODY MASS INDEX: 23.71 KG/M2 | HEIGHT: 69 IN | WEIGHT: 160.06 LBS

## 2018-03-20 DIAGNOSIS — R33.9 URINARY RETENTION: Primary | ICD-10-CM

## 2018-03-20 PROCEDURE — 99999 PR PBB SHADOW E&M-EST. PATIENT-LVL III: CPT | Mod: PBBFAC,,,

## 2018-03-20 PROCEDURE — 99213 OFFICE O/P EST LOW 20 MIN: CPT | Mod: PBBFAC

## 2018-03-20 NOTE — PROGRESS NOTES
Pt in for [id number]=[id number]. Dr. Ward notified. Nurse visit scheduled for next Tuesday for recheck. Pt verbalized understanding.

## 2018-03-27 ENCOUNTER — CLINICAL SUPPORT (OUTPATIENT)
Dept: UROLOGY | Facility: CLINIC | Age: 78
End: 2018-03-27
Payer: OTHER GOVERNMENT

## 2018-03-27 DIAGNOSIS — R33.9 URINARY RETENTION: Primary | ICD-10-CM

## 2018-03-27 LAB — POC RESIDUAL URINE VOLUME: 58 ML (ref 0–100)

## 2018-03-27 PROCEDURE — 51798 US URINE CAPACITY MEASURE: CPT | Mod: PBBFAC

## 2018-04-24 ENCOUNTER — TELEPHONE (OUTPATIENT)
Dept: UROLOGY | Facility: CLINIC | Age: 78
End: 2018-04-24

## 2018-04-24 NOTE — TELEPHONE ENCOUNTER
----- Message from Dalia Nails sent at 4/24/2018  4:07 PM CDT -----  [facility] to [facility], the patient has an appt on 4-26-18 to see Dr Ward, the patient had an appt on 3-29-18 with [facility] at the [facility] and his next appt is scheduled for 7-23-18 with the [facility]ist at the [facility] and the patient do not have an authorization for Dr Ward per Adele MADSEN at the [facility]. Patient was notified and instructed that his appt with Dr Ward would be cancelled b/c the [facility] did not [facility] visit and if he wanted to continue to see Dr Ward he would have to pay out of pocket and he did not want to continue to do that, so I instructed him to make sure he keeps [facility] on [facility]-18 @ 1pm, he stated he would keep his appt. I sent Janina the nurse a message letting her know patient will [facility] by [facility].

## 2018-04-30 ENCOUNTER — HOSPITAL ENCOUNTER (INPATIENT)
Facility: HOSPITAL | Age: 78
LOS: 2 days | Discharge: NURSING, REHAB OR LONG-TERM CARE | DRG: 064 | End: 2018-05-03
Attending: EMERGENCY MEDICINE | Admitting: INTERNAL MEDICINE
Payer: OTHER GOVERNMENT

## 2018-04-30 DIAGNOSIS — I63.9 CEREBROVASCULAR ACCIDENT (CVA): ICD-10-CM

## 2018-04-30 DIAGNOSIS — I63.9 CEREBROVASCULAR ACCIDENT (CVA), UNSPECIFIED MECHANISM: Primary | ICD-10-CM

## 2018-04-30 DIAGNOSIS — I25.10 CORONARY ARTERY DISEASE INVOLVING NATIVE CORONARY ARTERY WITHOUT ANGINA PECTORIS, UNSPECIFIED WHETHER NATIVE OR TRANSPLANTED HEART: ICD-10-CM

## 2018-04-30 DIAGNOSIS — R53.1 WEAKNESS: ICD-10-CM

## 2018-04-30 DIAGNOSIS — I63.9 CVA (CEREBRAL VASCULAR ACCIDENT): ICD-10-CM

## 2018-04-30 LAB
ALBUMIN SERPL BCP-MCNC: 2.3 G/DL
ALP SERPL-CCNC: 69 U/L
ALT SERPL W/O P-5'-P-CCNC: 27 U/L
ANION GAP SERPL CALC-SCNC: 7 MMOL/L
APTT BLDCRRT: 36.8 SEC
AST SERPL-CCNC: 37 U/L
BACTERIA #/AREA URNS HPF: ABNORMAL /HPF
BASOPHILS # BLD AUTO: 0.06 K/UL
BASOPHILS NFR BLD: 0.6 %
BILIRUB SERPL-MCNC: 0.4 MG/DL
BILIRUB UR QL STRIP: ABNORMAL
BUN SERPL-MCNC: 31 MG/DL
CALCIUM SERPL-MCNC: 8.4 MG/DL
CHLORIDE SERPL-SCNC: 108 MMOL/L
CK SERPL-CCNC: 607 U/L
CLARITY UR: ABNORMAL
CO2 SERPL-SCNC: 29 MMOL/L
COLOR UR: ABNORMAL
CREAT SERPL-MCNC: 1.8 MG/DL
DIFFERENTIAL METHOD: ABNORMAL
EOSINOPHIL # BLD AUTO: 0.2 K/UL
EOSINOPHIL NFR BLD: 1.8 %
ERYTHROCYTE [DISTWIDTH] IN BLOOD BY AUTOMATED COUNT: 16.5 %
EST. GFR  (AFRICAN AMERICAN): 41 ML/MIN/1.73 M^2
EST. GFR  (NON AFRICAN AMERICAN): 36 ML/MIN/1.73 M^2
GLUCOSE SERPL-MCNC: 121 MG/DL
GLUCOSE UR QL STRIP: NEGATIVE
HCT VFR BLD AUTO: 34.4 %
HGB BLD-MCNC: 10.8 G/DL
HGB UR QL STRIP: ABNORMAL
HYALINE CASTS #/AREA URNS LPF: 0 /LPF
INR PPP: 1.1
KETONES UR QL STRIP: NEGATIVE
LACTATE SERPL-SCNC: 1.2 MMOL/L
LEUKOCYTE ESTERASE UR QL STRIP: NEGATIVE
LYMPHOCYTES # BLD AUTO: 1.3 K/UL
LYMPHOCYTES NFR BLD: 13.4 %
MAGNESIUM SERPL-MCNC: 1.8 MG/DL
MCH RBC QN AUTO: 25.5 PG
MCHC RBC AUTO-ENTMCNC: 31.4 G/DL
MCV RBC AUTO: 81 FL
MICROSCOPIC COMMENT: ABNORMAL
MONOCYTES # BLD AUTO: 0.7 K/UL
MONOCYTES NFR BLD: 7.3 %
NEUTROPHILS # BLD AUTO: 7.3 K/UL
NEUTROPHILS NFR BLD: 76.9 %
NITRITE UR QL STRIP: NEGATIVE
PH UR STRIP: 6 [PH] (ref 5–8)
PHOSPHATE SERPL-MCNC: 2.9 MG/DL
PLATELET # BLD AUTO: 194 K/UL
PMV BLD AUTO: 9.9 FL
POTASSIUM SERPL-SCNC: 3.4 MMOL/L
PROT SERPL-MCNC: 7.2 G/DL
PROT UR QL STRIP: ABNORMAL
PROTHROMBIN TIME: 10.9 SEC
RBC # BLD AUTO: 4.24 M/UL
RBC #/AREA URNS HPF: >100 /HPF (ref 0–4)
SODIUM SERPL-SCNC: 144 MMOL/L
SP GR UR STRIP: 1.02 (ref 1–1.03)
TROPONIN I SERPL DL<=0.01 NG/ML-MCNC: 0.06 NG/ML
TSH SERPL DL<=0.005 MIU/L-ACNC: 0.7 UIU/ML
URN SPEC COLLECT METH UR: ABNORMAL
UROBILINOGEN UR STRIP-ACNC: 1 EU/DL
WBC # BLD AUTO: 9.45 K/UL
WBC #/AREA URNS HPF: 1 /HPF (ref 0–5)

## 2018-04-30 PROCEDURE — 84443 ASSAY THYROID STIM HORMONE: CPT

## 2018-04-30 PROCEDURE — 93005 ELECTROCARDIOGRAM TRACING: CPT

## 2018-04-30 PROCEDURE — 83735 ASSAY OF MAGNESIUM: CPT

## 2018-04-30 PROCEDURE — 80053 COMPREHEN METABOLIC PANEL: CPT

## 2018-04-30 PROCEDURE — 83605 ASSAY OF LACTIC ACID: CPT

## 2018-04-30 PROCEDURE — 96365 THER/PROPH/DIAG IV INF INIT: CPT

## 2018-04-30 PROCEDURE — 84484 ASSAY OF TROPONIN QUANT: CPT

## 2018-04-30 PROCEDURE — 85025 COMPLETE CBC W/AUTO DIFF WBC: CPT

## 2018-04-30 PROCEDURE — 99285 EMERGENCY DEPT VISIT HI MDM: CPT | Mod: 25

## 2018-04-30 PROCEDURE — 81000 URINALYSIS NONAUTO W/SCOPE: CPT

## 2018-04-30 PROCEDURE — 82550 ASSAY OF CK (CPK): CPT

## 2018-04-30 PROCEDURE — 93010 ELECTROCARDIOGRAM REPORT: CPT | Mod: ,,, | Performed by: INTERNAL MEDICINE

## 2018-04-30 PROCEDURE — 85610 PROTHROMBIN TIME: CPT

## 2018-04-30 PROCEDURE — 84100 ASSAY OF PHOSPHORUS: CPT

## 2018-04-30 PROCEDURE — 85730 THROMBOPLASTIN TIME PARTIAL: CPT

## 2018-05-01 PROBLEM — L89.153 PRESSURE ULCER OF SACRAL REGION, STAGE 3: Status: ACTIVE | Noted: 2018-05-01

## 2018-05-01 PROBLEM — R06.02 SOB (SHORTNESS OF BREATH): Status: ACTIVE | Noted: 2018-05-01

## 2018-05-01 PROBLEM — L89.93: Status: ACTIVE | Noted: 2018-05-01

## 2018-05-01 PROBLEM — L89.621 PRESSURE ULCER OF LEFT HEEL, STAGE 1: Status: ACTIVE | Noted: 2018-05-01

## 2018-05-01 PROBLEM — Z87.891 FORMER SMOKER: Status: ACTIVE | Noted: 2018-05-01

## 2018-05-01 PROBLEM — N40.0 BPH (BENIGN PROSTATIC HYPERPLASIA): Status: ACTIVE | Noted: 2018-05-01

## 2018-05-01 PROBLEM — E11.9 TYPE 2 DIABETES MELLITUS: Status: ACTIVE | Noted: 2018-05-01

## 2018-05-01 PROBLEM — N18.30 CKD (CHRONIC KIDNEY DISEASE), STAGE III: Status: ACTIVE | Noted: 2018-05-01

## 2018-05-01 PROBLEM — I63.9 CEREBROVASCULAR ACCIDENT (CVA): Status: ACTIVE | Noted: 2018-05-01

## 2018-05-01 PROBLEM — I10 ESSENTIAL HYPERTENSION: Status: ACTIVE | Noted: 2018-05-01

## 2018-05-01 PROBLEM — E44.0 MODERATE MALNUTRITION: Status: ACTIVE | Noted: 2018-05-01

## 2018-05-01 PROBLEM — E87.6 HYPOKALEMIA: Status: ACTIVE | Noted: 2018-05-01

## 2018-05-01 PROBLEM — G47.30 SLEEP APNEA: Status: ACTIVE | Noted: 2018-05-01

## 2018-05-01 LAB
ALBUMIN SERPL BCP-MCNC: 2 G/DL
ANION GAP SERPL CALC-SCNC: 5 MMOL/L
AORTIC VALVE REGURGITATION: ABNORMAL
BACTERIA #/AREA URNS HPF: ABNORMAL /HPF
BASOPHILS # BLD AUTO: 0.05 K/UL
BASOPHILS NFR BLD: 0.6 %
BILIRUB UR QL STRIP: NEGATIVE
BNP SERPL-MCNC: 2691 PG/ML
BUN SERPL-MCNC: 28 MG/DL
CALCIUM SERPL-MCNC: 8.1 MG/DL
CHLORIDE SERPL-SCNC: 110 MMOL/L
CHOLEST SERPL-MCNC: 121 MG/DL
CHOLEST/HDLC SERPL: 2.8 {RATIO}
CLARITY UR: CLEAR
CO2 SERPL-SCNC: 28 MMOL/L
COLOR UR: YELLOW
CREAT SERPL-MCNC: 1.6 MG/DL
DIASTOLIC DYSFUNCTION: YES
DIFFERENTIAL METHOD: ABNORMAL
EOSINOPHIL # BLD AUTO: 0.2 K/UL
EOSINOPHIL NFR BLD: 2.6 %
ERYTHROCYTE [DISTWIDTH] IN BLOOD BY AUTOMATED COUNT: 16.6 %
EST. GFR  (AFRICAN AMERICAN): 47 ML/MIN/1.73 M^2
EST. GFR  (NON AFRICAN AMERICAN): 41 ML/MIN/1.73 M^2
ESTIMATED AVG GLUCOSE: 117 MG/DL
GLUCOSE SERPL-MCNC: 117 MG/DL
GLUCOSE UR QL STRIP: NEGATIVE
HBA1C MFR BLD HPLC: 5.7 %
HCT VFR BLD AUTO: 30.9 %
HDLC SERPL-MCNC: 43 MG/DL
HDLC SERPL: 35.5 %
HGB BLD-MCNC: 9.7 G/DL
HGB UR QL STRIP: ABNORMAL
HYALINE CASTS #/AREA URNS LPF: 0 /LPF
KETONES UR QL STRIP: NEGATIVE
LDLC SERPL CALC-MCNC: 64.2 MG/DL
LEUKOCYTE ESTERASE UR QL STRIP: NEGATIVE
LYMPHOCYTES # BLD AUTO: 1.3 K/UL
LYMPHOCYTES NFR BLD: 15.2 %
MAGNESIUM SERPL-MCNC: 1.9 MG/DL
MCH RBC QN AUTO: 25.4 PG
MCHC RBC AUTO-ENTMCNC: 31.4 G/DL
MCV RBC AUTO: 81 FL
MICROSCOPIC COMMENT: ABNORMAL
MITRAL VALVE REGURGITATION: ABNORMAL
MONOCYTES # BLD AUTO: 1 K/UL
MONOCYTES NFR BLD: 11.5 %
NEUTROPHILS # BLD AUTO: 6.2 K/UL
NEUTROPHILS NFR BLD: 70.1 %
NITRITE UR QL STRIP: NEGATIVE
NONHDLC SERPL-MCNC: 78 MG/DL
PH UR STRIP: 6 [PH] (ref 5–8)
PHOSPHATE SERPL-MCNC: 2.6 MG/DL
PHOSPHATE SERPL-MCNC: 2.6 MG/DL
PLATELET # BLD AUTO: 186 K/UL
PMV BLD AUTO: 10.2 FL
POCT GLUCOSE: 114 MG/DL (ref 70–110)
POCT GLUCOSE: 122 MG/DL (ref 70–110)
POCT GLUCOSE: 122 MG/DL (ref 70–110)
POCT GLUCOSE: 132 MG/DL (ref 70–110)
POTASSIUM SERPL-SCNC: 3.2 MMOL/L
PROT UR QL STRIP: ABNORMAL
RBC # BLD AUTO: 3.82 M/UL
RBC #/AREA URNS HPF: 8 /HPF (ref 0–4)
RETIRED EF AND QEF - SEE NOTES: 40 (ref 55–65)
SODIUM SERPL-SCNC: 143 MMOL/L
SP GR UR STRIP: 1.01 (ref 1–1.03)
TRICUSPID VALVE REGURGITATION: ABNORMAL
TRIGL SERPL-MCNC: 69 MG/DL
URN SPEC COLLECT METH UR: ABNORMAL
UROBILINOGEN UR STRIP-ACNC: NEGATIVE EU/DL
WBC # BLD AUTO: 8.79 K/UL
WBC #/AREA URNS HPF: 1 /HPF (ref 0–5)
YEAST URNS QL MICRO: ABNORMAL

## 2018-05-01 PROCEDURE — 81000 URINALYSIS NONAUTO W/SCOPE: CPT

## 2018-05-01 PROCEDURE — 97530 THERAPEUTIC ACTIVITIES: CPT

## 2018-05-01 PROCEDURE — 93306 TTE W/DOPPLER COMPLETE: CPT | Mod: 26,,, | Performed by: INTERNAL MEDICINE

## 2018-05-01 PROCEDURE — G8979 MOBILITY GOAL STATUS: HCPCS | Mod: CK

## 2018-05-01 PROCEDURE — 93306 TTE W/DOPPLER COMPLETE: CPT

## 2018-05-01 PROCEDURE — 85025 COMPLETE CBC W/AUTO DIFF WBC: CPT

## 2018-05-01 PROCEDURE — 83880 ASSAY OF NATRIURETIC PEPTIDE: CPT

## 2018-05-01 PROCEDURE — 96372 THER/PROPH/DIAG INJ SC/IM: CPT

## 2018-05-01 PROCEDURE — 94660 CPAP INITIATION&MGMT: CPT

## 2018-05-01 PROCEDURE — 36415 COLL VENOUS BLD VENIPUNCTURE: CPT

## 2018-05-01 PROCEDURE — G8987 SELF CARE CURRENT STATUS: HCPCS | Mod: CM

## 2018-05-01 PROCEDURE — 99900035 HC TECH TIME PER 15 MIN (STAT)

## 2018-05-01 PROCEDURE — 25000242 PHARM REV CODE 250 ALT 637 W/ HCPCS: Performed by: HOSPITALIST

## 2018-05-01 PROCEDURE — 94640 AIRWAY INHALATION TREATMENT: CPT

## 2018-05-01 PROCEDURE — G8988 SELF CARE GOAL STATUS: HCPCS | Mod: CK

## 2018-05-01 PROCEDURE — 25000003 PHARM REV CODE 250: Performed by: HOSPITALIST

## 2018-05-01 PROCEDURE — 21400001 HC TELEMETRY ROOM

## 2018-05-01 PROCEDURE — G8978 MOBILITY CURRENT STATUS: HCPCS | Mod: CM

## 2018-05-01 PROCEDURE — 97166 OT EVAL MOD COMPLEX 45 MIN: CPT

## 2018-05-01 PROCEDURE — 83036 HEMOGLOBIN GLYCOSYLATED A1C: CPT

## 2018-05-01 PROCEDURE — 25000003 PHARM REV CODE 250: Performed by: NURSE PRACTITIONER

## 2018-05-01 PROCEDURE — 99223 1ST HOSP IP/OBS HIGH 75: CPT | Mod: ,,, | Performed by: PSYCHIATRY & NEUROLOGY

## 2018-05-01 PROCEDURE — 63600175 PHARM REV CODE 636 W HCPCS: Performed by: HOSPITALIST

## 2018-05-01 PROCEDURE — 99900037 HC PT THERAPY SCREENING (STAT)

## 2018-05-01 PROCEDURE — 80069 RENAL FUNCTION PANEL: CPT

## 2018-05-01 PROCEDURE — 27000190 HC CPAP FULL FACE MASK W/VALVE

## 2018-05-01 PROCEDURE — 83735 ASSAY OF MAGNESIUM: CPT

## 2018-05-01 PROCEDURE — 97535 SELF CARE MNGMENT TRAINING: CPT

## 2018-05-01 PROCEDURE — 80061 LIPID PANEL: CPT

## 2018-05-01 PROCEDURE — 63600175 PHARM REV CODE 636 W HCPCS: Performed by: NURSE PRACTITIONER

## 2018-05-01 PROCEDURE — 97163 PT EVAL HIGH COMPLEX 45 MIN: CPT

## 2018-05-01 RX ORDER — ATORVASTATIN CALCIUM 40 MG/1
40 TABLET, FILM COATED ORAL DAILY
Status: DISCONTINUED | OUTPATIENT
Start: 2018-05-01 | End: 2018-05-03 | Stop reason: HOSPADM

## 2018-05-01 RX ORDER — CLOPIDOGREL BISULFATE 75 MG/1
75 TABLET ORAL DAILY
COMMUNITY

## 2018-05-01 RX ORDER — HYDRALAZINE HYDROCHLORIDE 20 MG/ML
10 INJECTION INTRAMUSCULAR; INTRAVENOUS EVERY 8 HOURS PRN
Status: DISCONTINUED | OUTPATIENT
Start: 2018-05-01 | End: 2018-05-03 | Stop reason: HOSPADM

## 2018-05-01 RX ORDER — POTASSIUM CHLORIDE 20 MEQ/1
20 TABLET, EXTENDED RELEASE ORAL ONCE
Status: COMPLETED | OUTPATIENT
Start: 2018-05-01 | End: 2018-05-01

## 2018-05-01 RX ORDER — LABETALOL HYDROCHLORIDE 5 MG/ML
20 INJECTION, SOLUTION INTRAVENOUS ONCE
Status: DISCONTINUED | OUTPATIENT
Start: 2018-05-01 | End: 2018-05-01

## 2018-05-01 RX ORDER — TAMSULOSIN HYDROCHLORIDE 0.4 MG/1
0.4 CAPSULE ORAL DAILY
Status: DISCONTINUED | OUTPATIENT
Start: 2018-05-01 | End: 2018-05-03 | Stop reason: HOSPADM

## 2018-05-01 RX ORDER — CLOPIDOGREL BISULFATE 75 MG/1
75 TABLET ORAL DAILY
Status: DISCONTINUED | OUTPATIENT
Start: 2018-05-01 | End: 2018-05-03 | Stop reason: HOSPADM

## 2018-05-01 RX ORDER — IPRATROPIUM BROMIDE AND ALBUTEROL SULFATE 2.5; .5 MG/3ML; MG/3ML
3 SOLUTION RESPIRATORY (INHALATION) EVERY 6 HOURS PRN
Status: DISCONTINUED | OUTPATIENT
Start: 2018-05-01 | End: 2018-05-03 | Stop reason: HOSPADM

## 2018-05-01 RX ORDER — ASPIRIN 81 MG/1
81 TABLET ORAL DAILY
Status: DISCONTINUED | OUTPATIENT
Start: 2018-05-01 | End: 2018-05-03 | Stop reason: HOSPADM

## 2018-05-01 RX ORDER — FINASTERIDE 5 MG/1
5 TABLET, FILM COATED ORAL DAILY
Status: DISCONTINUED | OUTPATIENT
Start: 2018-05-01 | End: 2018-05-03 | Stop reason: HOSPADM

## 2018-05-01 RX ORDER — SODIUM CHLORIDE 0.9 % (FLUSH) 0.9 %
5 SYRINGE (ML) INJECTION
Status: DISCONTINUED | OUTPATIENT
Start: 2018-05-01 | End: 2018-05-03 | Stop reason: HOSPADM

## 2018-05-01 RX ORDER — HEPARIN SODIUM 5000 [USP'U]/ML
5000 INJECTION, SOLUTION INTRAVENOUS; SUBCUTANEOUS EVERY 8 HOURS
Status: DISCONTINUED | OUTPATIENT
Start: 2018-05-01 | End: 2018-05-03 | Stop reason: HOSPADM

## 2018-05-01 RX ORDER — IBUPROFEN 200 MG
24 TABLET ORAL
Status: DISCONTINUED | OUTPATIENT
Start: 2018-05-01 | End: 2018-05-03 | Stop reason: HOSPADM

## 2018-05-01 RX ORDER — IBUPROFEN 200 MG
16 TABLET ORAL
Status: DISCONTINUED | OUTPATIENT
Start: 2018-05-01 | End: 2018-05-03 | Stop reason: HOSPADM

## 2018-05-01 RX ORDER — INSULIN ASPART 100 [IU]/ML
1-10 INJECTION, SOLUTION INTRAVENOUS; SUBCUTANEOUS
Status: DISCONTINUED | OUTPATIENT
Start: 2018-05-01 | End: 2018-05-03 | Stop reason: HOSPADM

## 2018-05-01 RX ORDER — ISOSORBIDE DINITRATE 10 MG/1
20 TABLET ORAL 3 TIMES DAILY
Status: DISCONTINUED | OUTPATIENT
Start: 2018-05-01 | End: 2018-05-03 | Stop reason: HOSPADM

## 2018-05-01 RX ORDER — LISINOPRIL 20 MG/1
40 TABLET ORAL DAILY
Status: DISCONTINUED | OUTPATIENT
Start: 2018-05-01 | End: 2018-05-03 | Stop reason: HOSPADM

## 2018-05-01 RX ORDER — ACETAMINOPHEN 325 MG/1
650 TABLET ORAL EVERY 4 HOURS PRN
Status: DISCONTINUED | OUTPATIENT
Start: 2018-05-01 | End: 2018-05-03 | Stop reason: HOSPADM

## 2018-05-01 RX ORDER — MESALAMINE 1000 MG/1
500 SUPPOSITORY RECTAL 2 TIMES DAILY
Status: DISCONTINUED | OUTPATIENT
Start: 2018-05-01 | End: 2018-05-03 | Stop reason: HOSPADM

## 2018-05-01 RX ORDER — AMLODIPINE BESYLATE 5 MG/1
5 TABLET ORAL DAILY
Status: DISCONTINUED | OUTPATIENT
Start: 2018-05-01 | End: 2018-05-03 | Stop reason: HOSPADM

## 2018-05-01 RX ORDER — FUROSEMIDE 10 MG/ML
20 INJECTION INTRAMUSCULAR; INTRAVENOUS ONCE
Status: COMPLETED | OUTPATIENT
Start: 2018-05-01 | End: 2018-05-01

## 2018-05-01 RX ORDER — ARFORMOTEROL TARTRATE 15 UG/2ML
15 SOLUTION RESPIRATORY (INHALATION) EVERY 12 HOURS
Status: DISCONTINUED | OUTPATIENT
Start: 2018-05-01 | End: 2018-05-03 | Stop reason: HOSPADM

## 2018-05-01 RX ORDER — GLUCAGON 1 MG
1 KIT INJECTION
Status: DISCONTINUED | OUTPATIENT
Start: 2018-05-01 | End: 2018-05-03 | Stop reason: HOSPADM

## 2018-05-01 RX ORDER — MAGNESIUM SULFATE 1 G/100ML
1 INJECTION INTRAVENOUS ONCE
Status: COMPLETED | OUTPATIENT
Start: 2018-05-01 | End: 2018-05-01

## 2018-05-01 RX ORDER — BUDESONIDE 0.25 MG/2ML
0.25 INHALANT ORAL EVERY 12 HOURS
Status: DISCONTINUED | OUTPATIENT
Start: 2018-05-01 | End: 2018-05-03 | Stop reason: HOSPADM

## 2018-05-01 RX ADMIN — ISOSORBIDE DINITRATE 20 MG: 10 TABLET ORAL at 09:05

## 2018-05-01 RX ADMIN — ARFORMOTEROL TARTRATE 15 MCG: 15 SOLUTION RESPIRATORY (INHALATION) at 08:05

## 2018-05-01 RX ADMIN — LISINOPRIL 40 MG: 20 TABLET ORAL at 09:05

## 2018-05-01 RX ADMIN — CLOPIDOGREL BISULFATE 75 MG: 75 TABLET ORAL at 02:05

## 2018-05-01 RX ADMIN — HEPARIN SODIUM 5000 UNITS: 5000 INJECTION, SOLUTION INTRAVENOUS; SUBCUTANEOUS at 06:05

## 2018-05-01 RX ADMIN — ASPIRIN 81 MG: 81 TABLET, COATED ORAL at 09:05

## 2018-05-01 RX ADMIN — BUDESONIDE 0.25 MG: 0.25 SUSPENSION RESPIRATORY (INHALATION) at 08:05

## 2018-05-01 RX ADMIN — MESALAMINE 500 MG: 1000 SUPPOSITORY RECTAL at 09:05

## 2018-05-01 RX ADMIN — HEPARIN SODIUM 5000 UNITS: 5000 INJECTION, SOLUTION INTRAVENOUS; SUBCUTANEOUS at 02:05

## 2018-05-01 RX ADMIN — METOPROLOL SUCCINATE 12.5 MG: 25 TABLET, EXTENDED RELEASE ORAL at 02:05

## 2018-05-01 RX ADMIN — FUROSEMIDE 20 MG: 10 INJECTION, SOLUTION INTRAVENOUS at 02:05

## 2018-05-01 RX ADMIN — POTASSIUM CHLORIDE 20 MEQ: 1500 TABLET, EXTENDED RELEASE ORAL at 02:05

## 2018-05-01 RX ADMIN — HEPARIN SODIUM 5000 UNITS: 5000 INJECTION, SOLUTION INTRAVENOUS; SUBCUTANEOUS at 10:05

## 2018-05-01 RX ADMIN — ATORVASTATIN CALCIUM 40 MG: 40 TABLET, FILM COATED ORAL at 09:05

## 2018-05-01 RX ADMIN — FINASTERIDE 5 MG: 5 TABLET, FILM COATED ORAL at 09:05

## 2018-05-01 RX ADMIN — ISOSORBIDE DINITRATE 20 MG: 10 TABLET ORAL at 02:05

## 2018-05-01 RX ADMIN — POTASSIUM CHLORIDE 20 MEQ: 1500 TABLET, EXTENDED RELEASE ORAL at 09:05

## 2018-05-01 RX ADMIN — TAMSULOSIN HYDROCHLORIDE 0.4 MG: 0.4 CAPSULE ORAL at 09:05

## 2018-05-01 RX ADMIN — MAGNESIUM SULFATE IN DEXTROSE 1 G: 10 INJECTION, SOLUTION INTRAVENOUS at 12:05

## 2018-05-01 RX ADMIN — AMLODIPINE BESYLATE 5 MG: 5 TABLET ORAL at 09:05

## 2018-05-01 NOTE — ASSESSMENT & PLAN NOTE
-  P/w LLE weakness  - CT brain showing subacute infarct in R corona radiata, questionable subacute lacunar infarct.  Old lateral lacunar infarcts.     - Admit to obs tele  - Order MRI brain, US carotid, and TTE  - Start ASA, statin, if cannot tolerate ASA, will start plavix  - Consult PT/OT to eval and treat  - Control BP Goal <140/80

## 2018-05-01 NOTE — PT/OT/SLP EVAL
Physical Therapy Evaluation    Patient Name:  Ryland Ortez Jr.   MRN:  [id number]    Recommendations:     Discharge Recommendations:  rehabilitation facility   Discharge Equipment Recommendations: none   Barriers to discharge: Decreased caregiver support, WIFE AT HOME ABLE TO OFFER 24 HOUR SUPERVISION BUT UNABLE TO PHYSICALLY ASSIST PT    Assessment:     Ryland Ortez Jr. is a 78 y.o. male admitted with a medical diagnosis of Cerebrovascular accident (CVA).  He presents with the following impairments/functional limitations:  weakness, impaired endurance, impaired functional mobilty, gait instability, impaired balance, decreased coordination, decreased safety awareness .    Rehab Prognosis:  GOOD; patient would benefit from acute skilled PT services to address these deficits and reach maximum level of function.      Recent Surgery: * No surgery found *     Plan:     During this hospitalization, patient to be seen 5 x/week to address the above listed problems via gait training, therapeutic activities, therapeutic exercises, neuromuscular re-education  · Plan of Care Expires:  05/08/18   Plan of Care Reviewed with: patient    Subjective     Communicated with NURSE MATIAS prior to session.  Patient found SEATED AT EOB UPON ARRIVAL, JUST GETTING BACK FROM X-RAY upon PT entry to room, agreeable to evaluation.      Chief Complaint: WEAKNESS  Patient comments/goals: WALK  Pain/Comfort:  · Pain Rating 1: 0/10    Patients cultural, spiritual, Gnosticism conflicts given the current situation:     Living Environment:  PT LIVES WITH WIFE 1 STORY HOUSE RAMP TO ENTER, AMB WITH ROLLATOR WALKER HOUSEHOLD DISTANCES, PT DOES NOT DRIVE, SPENCER WITH ADL'S USING DME  Prior to admission, patients level of function was SPENCER.  Patient has the following equipment: rollator, grab bar, shower chair, wheelchair.  DME owned (not currently used): none.  Upon discharge, patient will have assistance from ?.    Objective:     Patient found with:  telemetry     General Precautions: Standard, fall   Orthopedic Precautions:N/A   Braces: N/A     Exams:  · Cognitive Exam:  Patient is oriented to Person, Place, Time and Situation and follows 90% of SIMPLE commands   · Sensation:    · -       Impaired  light/touch L FOOT  · RUE ROM: WFL except LIMITED SHOULDER MVMT  · RUE Strength: GROSSLY 4-/5  · LUE ROM: WFL except LIMITED SHOULDER MVMT  · LUE Strength: GROSSLY 4-/5  · RLE ROM: WFL  · RLE Strength: GROSSLY 4/5  · LLE ROM: WFL except AAROM TO COMPLETE FULL ROM  · LLE Strength: GROSSLY 2+/5    Functional Mobility:  · Bed Mobility:     · Rolling Left:  moderate assistance  · Scooting: moderate assistance and SEATED SIDE SCOOTING IN BED  · Sit to Supine: moderate assistance  · Transfers:     · Sit to Stand:  maximal assistance and PT UNABLE TO STAND ERECT DUE TO LLE WEAKNESS DESPITE MAXA with rolling walker  · Gait: UNABLE TO ASSESS AT THIS TIME  · Balance: FAIR STATIC SITTING BALANCE, POOR+ DYNAMIC SITTING BALANCE    AM-PAC 6 CLICK MOBILITY  Total Score:8     Therapeutic Activities and Exercises:   PT EDUCATED IN BLE THEREX TO PERFORM WHILE SUPINE IN BED OR SEATED IN CHAIR.  PT ABLE TO SUPINE SCOOT TOWARD HEAD OF BED MAINLY USING BUE BUT ATTEMPTING TO USE BLE AS WELL, BED IN [place] POSITION    Patient left supine with all lines intact, call button in reach, bed alarm on and NURSE notified.    GOALS:    Physical Therapy Goals        Problem: Physical Therapy Goal    Goal Priority Disciplines Outcome Goal Variances Interventions   Physical Therapy Goal     PT/OT, PT      Description:  LTG'S TO BE MET IN 7 DAYS (5-8-18)  1. PT WILL REQUIRE ZACHARY FOR BED MOBILITY  2. PT WILL REQUIRE MODA FOR TF'S  3. PT WILL AMB 50' WITH RW AND MODA  4. PT WILL TOLERATE BLE THEREX X 20 REPS AROM  5. PT WILL DEMO P+ DYNAMIC BALANCE DURING GAIT                    History:     Past Medical History:   Diagnosis Date    Anticoagulant long-term use     CAD (coronary artery disease)  7/8/2013    Cerebrovascular disease     Chronic kidney disease (CKD), stage III (moderate)     Crohn's disease     History of PTCA 1/20/2016    Hyperlipidemia     Hypertension     Hypertension     Lumbar stenosis     MI (myocardial infarction)     NSVT (nonsustained ventricular tachycardia) 1/20/2016    Old MI (myocardial infarction) 1/20/2016    Overactive bladder     Peripheral vascular disease 1/20/2016    Prediabetes     PVD (peripheral vascular disease) 7/8/2013    Stroke        Past Surgical History:   Procedure Laterality Date    ARTERIAL ANEURYSM REPAIR      AAA repair    CAROTID ARTERY ANGIOPLASTY      COLONOSCOPY N/A 10/9/2015    Procedure: COLONOSCOPY;  Surgeon: Dedrick Brownlee MD;  Location: [facility] ENDO;  Service: Endoscopy;  Laterality: N/A;    COLONOSCOPY N/A 11/24/2015    Procedure: COLONOSCOPY;  Surgeon: Dedrick Brownlee MD;  Location: [facility] ENDO;  Service: Endoscopy;  Laterality: N/A;    CORONARY ANGIOPLASTY WITH STENT PLACEMENT      2010?    ROTATOR CUFF REPAIR      Right       Clinical Decision Making:     History  Co-morbidities and personal factors that may impact the plan of care Examination  Body Structures and Functions, activity limitations and participation restrictions that may impact the plan of care Clinical Presentation   Decision Making/ Complexity Score   Co-morbidities:   [] Time since onset of injury / illness / exacerbation  [] Status of current condition  []Patient's cognitive status and safety concerns    [] Multiple Medical Problems (see med hx)  Personal Factors:   [] Patient's age  [] Prior Level of function   [] Patient's home situation (environment and family support)  [] Patient's level of motivation  [] Expected progression of patient      HISTORY:(criteria)    [] 07311 - no personal factors/history    [] 07269 - has 1-2 personal factor/comorbidity     [] 77425 - has >3 personal factor/comorbidity     Body Regions:  [] Objective examination  findings  [] Head     []  Neck  [] Trunk   [] Upper Extremity  [] Lower Extremity    Body Systems:  [] For communication ability, affect, cognition, language, and learning style: the assessment of the ability to make needs known, consciousness, orientation (person, place, and time), expected emotional /behavioral responses, and learning preferences (eg, learning barriers, education  needs)  [] For the neuromuscular system: a general assessment of gross coordinated movement (eg, balance, gait, locomotion, transfers, and transitions) and motor function  (motor control and motor learning)  [] For the musculoskeletal system: the assessment of gross symmetry, gross range of motion, gross strength, height, and weight  [] For the integumentary system: the assessment of pliability(texture), presence of scar formation, skin color, and skin integrity  [] For cardiovascular/pulmonary system: the assessment of heart rate, respiratory rate, blood pressure, and edema     Activity limitations:    [] Patient's cognitive status and saf ety concerns          [] Status of current condition      [] Weight bearing restriction  [] Cardiopulmunary Restriction    Participation Restrictions:   [] Goals and goal agreement with the patient     [] Rehab potential (prognosis) and probable outcome      Examination of Body System: (criteria)    [] 84430 - addressing 1-2 elements    [] 93890 - addressing a total of 3 or more elements     [] 72196 -  Addressing a total of 4 or more elements         Clinical Presentation: (criteria)  Choose one     On examination of body system using standardized tests and measures patient presents with (CHOOSE ONE) elements from any of the following: body structures and functions, activity limitations, and/or participation restrictions.  Leading to a clinical presentation that is considered (CHOOSE ONE)                              Clinical Decision Making  (Eval Complexity):  Choose One     Time Tracking:     PT  Received On: 05/01/18  PT Start Time: 1005     PT Stop Time: 1030  PT Total Time (min): 25 min     Billable Minutes: Evaluation 15 and Therapeutic Activity 10     PT ENCOURAGED TO CALL FOR ASSISTANCE WITH ALL NEEDS DUE TO FALL RISK STATUS, PT AGREEABLE    Sona Whittaker, PT  05/01/2018

## 2018-05-01 NOTE — PLAN OF CARE
Problem: Patient Care Overview  Goal: Plan of Care Review  Outcome: Ongoing (interventions implemented as appropriate)  Pt AAOx4, PIV intact and WNL, No pain noted. No falls reported. Pt has been turned q 2 hr with legs/heels elevated on pillows and wedge in place. All patient care orders carried out per MD order. Pt educated and oriented to room, equipment and hospital policies. Fall teaching performed. Call light within reach, bed locked and in low position. Tele monitor in use.

## 2018-05-01 NOTE — CONSULTS
[facility] -   Adult Nutrition  Consult Note    SUMMARY     Recommendations    Recommendation/Intervention: 1. Continue current diet. 2. Add beneprotein BID. 3. Add boost plus 1 can daily. 4.  Discussed importance of adequate intake and sources of healthy calories to increase overall calorie intake. 4. Will continue to monitor.   Goals: Meet > 85 % EEN/EPN while admitted  Nutrition Goal Status: new  Communication of RD Recs:  (POC, sticky note)    Reason for Assessment    Reason for Assessment: consult  Dx:  1. Cerebrovascular accident (CVA), unspecified mechanism    2. Weakness    3. CVA (cerebral vascular accident)    4. Cerebrovascular accident (CVA)      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    Overactive bladder     Peripheral vascular disease 1/20/2016    Prediabetes     PVD (peripheral vascular disease) 7/8/2013    Stroke        General Information Comments: Pt with hx of Crohn's disease. Patient reports weight loss of 14 lbs  (8.53 %) within the last 6 months. Patient reports good appetite and intake at home.  Pt drinks 1 can of boost plus daily at home.  Per physical assessment ~ mild subcutaneous fat and muscle loss.  Patient reports good appetite today (PO intake ~ 100 %).   Nutrition Discharge Planning: Cardiac diet + ONS    Nutrition Risk Screen    Nutrition Risk Screen: other (see comments) (Crohn's )    Nutrition/Diet History    Do you have any cultural, spiritual, Faith conflicts, given your current situation?: no    Anthropometrics    Temp: 97.6 °F (36.4  "°C)  Height Method: Stated  Height: 5' 9" (175.3 cm)  Height (inches): 69 in  Weight Method: Bed Scale  Weight: 68.6 kg (151 lb 3.8 oz)  Weight (lb): 151.24 lb  Ideal Body Weight (IBW), Male: 160 lb  % Ideal Body Weight, Male (lb): 94.52 lb  BMI (Calculated): 22.4  BMI Grade: 18.5-24.9 - normal  Usual Body Weight (UBW), k kg  % Usual Body Weight: 91.66  % Weight Change From Usual Weight: -8.53 %       Lab/Procedures/Meds    Pertinent Labs Reviewed: reviewed  BMP  Lab Results   Component Value Date     2018    K 3.2 (L) 2018     2018    CO2 28 2018    BUN 28 (H) 2018    CREATININE 1.6 (H) 2018    CALCIUM 8.1 (L) 2018    ANIONGAP 5 (L) 2018    ESTGFRAFRICA 47 (A) 2018    EGFRNONAA 41 (A) 2018     Lab Results   Component Value Date    CALCIUM 8.1 (L) 2018    PHOS 2.6 (L) 2018    PHOS 2.6 (L) 2018     Lab Results   Component Value Date    ALBUMIN 2.0 (L) 2018       Recent Labs  Lab 18  1152   POCTGLUCOSE 132*     .  Lab Results   Component Value Date    HGBA1C 5.7 (H) 2018     Pertinent Medications Reviewed: reviewed    Physical Findings/Assessment    Overall Physical Appearance:  (mild subcutaneos fat and muscle loss)  Oral/Mouth Cavity:  (WDL)  Skin:  (multiple pressure injuries)    Estimated/Assessed Needs    Weight Used For Calorie Calculations: 68.6 kg (151 lb 3.8 oz)  Energy Calorie Requirements (kcal):  -   Energy Need Method: Kcal/kg  Protein Requirements: 82 g/day  Weight Used For Protein Calculations: 68.6 kg (151 lb 3.8 oz)     Fluid Need Method: RDA Method (or per MD)  RDA Method (mL):          Nutrition Prescription Ordered    Current Diet Order: Cardiac    Evaluation of Received Nutrient/Fluid Intake          Intake/Output Summary (Last 24 hours) at 18 1453  Last data filed at 18 0700   Gross per 24 hour   Intake               60 ml   Output              251 ml   Net      "        -191 ml       % Intake of Estimated Energy Needs: 75 - 100 %  % Meal Intake: 75 - 100 %    Nutrition Risk          Assessment and Plan    Moderate malnutrition    Malnutrition in the context of Chronic Illness/Injury    Related to (etiology):  Predicted suboptimal energy intake associated with Chron's disease     Signs and Symptoms (as evidenced by):    Body Fat Depletion: mild depletion of orbitals and triceps   Muscle Mass Depletion: mild depletion of temples, clavicle region, scapular region and lower extremities   Weight Loss: weight loss of 14 lbs (8.53 %) within the last 6 months    Interventions/Recommendations (treatment strategy):  See above     Nutrition Diagnosis Status:  New             Monitor and Evaluation    Food and Nutrient Intake: energy intake  Food and Nutrient Adminstration: diet order  Anthropometric Measurements: weight  Biochemical Data, Medical Tests and Procedures: electrolyte and renal panel, glucose/endocrine profile  Nutrition-Focused Physical Findings: overall appearance, skin     Nutrition Follow-Up    RD Follow-up?: Yes (1xweekly)

## 2018-05-01 NOTE — ASSESSMENT & PLAN NOTE
- Stable  - Initial UA with >100 RBCs  - Repeat UA pending  - Patient denies hematuria  - Renal CT pending  - Continue home Finasteride, Tamsulosin  - monitor I/O

## 2018-05-01 NOTE — PROGRESS NOTES
"[facility] Hospital Medicine  Progress Note    Patient Name: Ryland Ortez Jr.  MRN: [id number]  Patient Class: IP- Inpatient   Admission Date: 4/30/2018  Length of Stay: 0 days  Attending Physician: Héctor Loco MD  Primary Care Provider: Janina Emerson MD        Subjective:     Principal Problem:Cerebrovascular accident (CVA)    HPI:  78M h/o CVA, COPD, HTN, PAD, CAD, Crohn's disease, and CKD stage III presents with L sided weakness x 1 week.  Reports "stiffiness".  Associated with mechanical falls x 3 and imbalance.  Prior to 1 week ago, patient was able to ambulate with walker without instability.   Also c/o SOB with exertion.  Patient is a former smoker.  He quit in 2008.  Was a 40 pack year smoker.   [facility].  Chart review does not provide further significant history pertaining to HPI.  In ER, CT brain show subacute infarct of right corona radiata, with questionable subacute lacunar infarct, old lacunar infarcts are seen.  Hospital medicine called for admission.          Hospital Course:  On 4/30 patient was admitted to [facility] secondary to Subacute CVA.  CT head showed Subtle zone of hypodensity in the right corona radiata, questionable subacute lacunar infarct.  Involutional changes.  Microvascular ischemic changes.  Old lateral lacunar infarcts.  No evidence of hemorrhage.  Carotid US showed No clinically significant area of stenosis.  There is a mild amount of atherosclerosis bilaterally.  MRI, ECHO, and Lipid panel pending.  Neuro consult pending.  PT/OT/ST evals pending.  Per d/w PT today, patient will need inpatient rehab.  SW consulted to assist with placement.  Initial UA showed >100 RBCs.  Patient denies hematuria and all bladder complaints.  Renal CT and repeat UA pending.  Patient reports taking ASA 81 mg daily, Plavix 75 mg daily and statin therapy, which have been resumed.  Periods of apnea noted while sleeping; patient reports he uses a CPAP " at night for h/o sleep apnea, will order for use here q hs and while napping.      Interval History: No acute events overnight.  Resting comfortably in bed with son at BS.  All results and POC discussed in detail.  Patient is agreeable to inpatient rehab.  SW consulted to assist with placement.  Left sided weakness persists.  Periods of apnea noted while sleeping; patient reports he uses a CPAP at night for h/o sleep apnea.      Review of Systems   Constitutional: Positive for fatigue. Negative for chills, diaphoresis and fever.   HENT: Negative for hearing loss, mouth sores, sore throat, tinnitus and trouble swallowing.    Eyes: Negative for pain, discharge and redness.   Respiratory: Negative for apnea, cough, choking, chest tightness, shortness of breath, wheezing and stridor.    Cardiovascular: Negative for chest pain, palpitations and leg swelling.   Gastrointestinal: Negative for abdominal distention, abdominal pain, blood in stool, constipation, diarrhea, nausea, rectal pain and vomiting.   Endocrine: Negative for cold intolerance, heat intolerance, polydipsia, polyphagia and polyuria.   Genitourinary: Negative for difficulty urinating, dysuria, flank pain, frequency, hematuria and urgency.   Musculoskeletal: Negative for arthralgias, back pain, gait problem, joint swelling, neck pain and neck stiffness.   Skin: Negative for color change, rash and wound.   Allergic/Immunologic: Negative for food allergies.   Neurological: Positive for weakness. Negative for dizziness, tremors, seizures, syncope, speech difficulty, light-headedness and headaches.        LUE and LLE weakness   Hematological: Negative for adenopathy. Does not bruise/bleed easily.   Psychiatric/Behavioral: Negative for agitation and confusion. The patient is not nervous/anxious.    All other systems reviewed and are negative.    Objective:     Vital Signs (Most Recent):  Temp: 97.6 °F (36.4 °C) (05/01/18 1132)  Pulse: 78 (05/01/18 1132)  Resp: 20  (05/01/18 1132)  BP: (!) 154/80 (05/01/18 1132)  SpO2: (!) 92 % (05/01/18 1132) Vital Signs (24h Range):  Temp:  [97.5 °F (36.4 °C)-98.6 °F (37 °C)] 97.6 °F (36.4 °C)  Pulse:  [75-84] 78  Resp:  [14-20] 20  SpO2:  [88 %-98 %] 92 %  BP: (154-192)/() 154/80     Weight: 68.6 kg (151 lb 3.8 oz)  Body mass index is 22.33 kg/m².    Intake/Output Summary (Last 24 hours) at 05/01/18 1243  Last data filed at 05/01/18 0700   Gross per 24 hour   Intake               60 ml   Output              251 ml   Net             -191 ml      Physical Exam   Constitutional: He is oriented to person, place, and time. He appears well-developed and well-nourished. No distress.   HENT:   Head: Normocephalic and atraumatic.   Mouth/Throat: Oropharynx is clear and moist.   Eyes: Conjunctivae are normal. Pupils are equal, round, and reactive to light. No scleral icterus.   Neck: No JVD present. No thyromegaly present.   Cardiovascular: Regular rhythm.  Exam reveals no gallop and no friction rub.    No murmur heard.  Pulmonary/Chest: Effort normal and breath sounds normal. No respiratory distress. He has no wheezes. He has no rales.   Abdominal: Soft. Bowel sounds are normal. He exhibits no distension. There is no tenderness. There is no guarding.   Musculoskeletal:   Strength 4/5 to LUE. LLE 0/5.  RUE and RLE 4/5.    Neurological: He is alert and oriented to person, place, and time. No cranial nerve deficit or sensory deficit. GCS eye subscore is 4. GCS verbal subscore is 5. GCS motor subscore is 6.   Follows all commands appropriately.  Speech clear.     Skin: Skin is warm. Capillary refill takes 2 to 3 seconds. He is not diaphoretic. No erythema.   Psychiatric: He has a normal mood and affect.   Nursing note and vitals reviewed.      Significant Labs:   BMP:     Recent Labs  Lab 05/01/18  0530   *      K 3.2*      CO2 28   BUN 28*   CREATININE 1.6*   CALCIUM 8.1*   MG 1.9     CBC:     Recent Labs  Lab 04/30/18  2137  05/01/18  0530   WBC 9.45 8.79   HGB 10.8* 9.7*   HCT 34.4* 30.9*    186     Coagulation:     Recent Labs  Lab 04/30/18 2135   INR 1.1   APTT 36.8*       Magnesium:     Recent Labs  Lab 04/30/18 2135 05/01/18  0530   MG 1.8 1.9     Troponin:     Recent Labs  Lab 04/30/18 2135   TROPONINI 0.058*     TSH:     Recent Labs  Lab 04/30/18 2135   TSH 0.696     Urine Studies:     Recent Labs  Lab 04/30/18 2205   COLORU Other*   APPEARANCEUA Hazy*   PHUR 6.0   SPECGRAV 1.025   PROTEINUA 3+*   GLUCUA Negative   KETONESU Negative   BILIRUBINUA 1+*   OCCULTUA 3+*   NITRITE Negative   UROBILINOGEN 1.0   LEUKOCYTESUR Negative   RBCUA >100*   WBCUA 1   BACTERIA Rare   HYALINECASTS 0       Significant Imaging: I have reviewed all pertinent imaging results/findings within the past 24 hours.    Imaging Results          US Carotid Bilateral (Final result)  Result time 05/01/18 10:04:33    Final result by CESAR Aparicio Sr., MD (05/01/18 10:04:33)                 Impression:      1. No clinically significant area of stenosis.  2. There is a mild amount of atherosclerosis bilaterally.  Validated velocity measurements with angiographic measurements, velocity criteria are extrapolated from diameter data as defined by the Society of Radiologists in Ultrasound Consensus Conference      Electronically signed by: Ward Aparicio MD  Date:    05/01/2018  Time:    10:04             Narrative:    EXAMINATION:  US CAROTID BILATERAL    CLINICAL HISTORY:  cva;    TECHNIQUE:  Multiple static ultrasound images are submitted for interpretation with color flow and spectral Doppler imaging.    COMPARISON:  None    FINDINGS:  There is a moderate amount of atherosclerosis bilaterally.    The following pertains to the right side of the neck. The common carotid artery has a normal arterial waveform with peak systolic velocity of 59 cm/sec and a diastolic velocity of 10 cm/sec. The internal carotid artery has a normal arterial waveform with a  peak systolic velocity of 73 cm/sec and a diastolic velocity of 5 cm/sec. The external carotid artery has a peak systolic velocity of 129 cm/sec. The vertebral artery has normal antegrade flow.    The following pertains to the left side of the neck. The common carotid artery has a normal arterial waveform with peak systolic velocity of 50 cm/sec and a diastolic velocity of 5 cm/sec. The internal carotid artery has a normal arterial waveform with a peak systolic velocity of 64 cm/sec and a diastolic velocity of 14 cm/sec. The external carotid artery has a peak systolic velocity of 120 cm/sec. The vertebral artery has normal antegrade flow.                               CT Head Without Contrast (Final result)  Result time 04/30/18 22:47:27    Final result by Srinivas Gallardo Jr., MD (04/30/18 22:47:27)                 Impression:     Subtle zone of hypodensity in the right corona radiata, questionable subacute lacunar infarct.  Involutional changes.  Microvascular ischemic changes.  Old lateral lacunar infarcts.    All CT scans at this facility use dose modulation, iterative reconstruction, and/or weight based dosing when appropriate to reduce radiation dose to as low as reasonably achievable.        Electronically signed by: SRINIVAS GALLARDO MD  Date:     04/30/18  Time:    22:47              Narrative:    Exam: CT HEAD WITHOUT CONTRAST    History:  Neuro deficit(s), subacute       Technique: Noncontrast axial images of the head were obtained.    Comparison:04/02/2003    Findings: Bilateral symmetric involutional changes.  Bilateral microvascular ischemic change.      Small zone of hypodensity in the right corona radiata, measuring 1 cm, could correlate with a subacute ischemic lacunar infarct, images 17-18 series 2.  Does patient had a left-sided symptomatology to correlate?      Tiny old appearing lacunar infarcts elsewhere in the right corona radiata and basal ganglia and bilateral thalami.      Ventricular system  is normal.  No hydrocephalus.  No midline shift.  No abnormal density to indicate acute major vascular distribution ischemic infarction or hemorrhage.  No mass effect.  No extra-axial fluid collections.     Visualized paranasal sinuses and mastoid air cells appear clear.      No calvarial fracture.    Vascular calcifications.                             X-Ray Chest AP Portable (Final result)  Result time 04/30/18 21:12:52    Final result by Srinivas Gallardo Jr., MD (04/30/18 21:12:52)                 Impression:          As above.      Electronically signed by: SRINIVAS GALLARDO MD  Date:     04/30/18  Time:    21:12              Narrative:    EXAM:   XR CHEST AP PORTABLE    CLINICAL HISTORY:   Weakness    COMPARISON:  01/19/2016    FINDINGS:   Heart size is normal. Benign linear streak in the right lung base likely some platelike atelectasis.  Linear-appearing marking and/or pleural thickening in the right midlung zone.  Some mild pulmonary edema is not excluded.  The dense alveolar infiltrates can be appreciated.  No large effusions.                              Assessment/Plan:      * Cerebrovascular accident (CVA)    -  Patient with LUE and LLE weakness  - CT brain showing subacute infarct in R corona radiata, questionable subacute lacunar infarct.  Old lateral lacunar infarcts.   - Carotid US negative for significant stenosis  - Neuro checks q 4 hours  - MRI, ECHO, and Lipid panel pending  - Neuro consult pending  - Continue home ASA, Statin, and Plavix  - PT/OT/ST to eval and treat  - Control BP Goal <140/80  - Supportive care  - SW consult for DC planning          SOB (shortness of breath)    - CXR atelectasis, no significant acute findings  - No si/sx of infection  - Significant smoking hx  - Continue Duonebs q4h prn sob/wheezes, symbicort q12h  - BNP elevated to 2600  - Lasix 20 mg IVP x 1 today  - ECHO pending  - Supplemental O2 prn to KS >92%  - Monitor        CKD (chronic kidney disease), stage III    -  Appears at baseline  - Monitor I/O  - Replete electrolytes prn  - Daily BMP          CAD (coronary artery disease)    - Asymptomatic  - Continue home ASA, Plavix, Statin  - Tele monitoring          Crohn's colitis    - Stable, no flare up  - Continue mesalamine at home dose          BPH (benign prostatic hyperplasia)    - Stable  - Initial UA with >100 RBCs  - Repeat UA pending  - Patient denies hematuria  - Renal CT pending  - Continue home Finasteride, Tamsulosin  - monitor I/O          Essential hypertension    - BP under fair control  - Continue Amlodipine, Isordil, Lisinopril, and Metoprolol at home dose  - Monitor        Type 2 diabetes mellitus    - Hold home meds  - A1c pending  - place on SSI Prn, poc glucose qac/qhs  - Monitor        Sleep apnea    - Patient reports h/o Sleep apnea and wears a CPAP at home  - CPAP q hs and while napping here  - Monitor          Hypokalemia    - KCL 3.2, Mag 1.9  - Replete today  - Daily BMP  - Monitor          VTE Risk Mitigation         Ordered     heparin (porcine) injection 5,000 Units  Every 8 hours      05/01/18 0004     IP VTE HIGH RISK PATIENT  Once      05/01/18 0004              Liliane Fonseca, DNP, ACNP-BC  Department of Hospital Medicine   [facility]

## 2018-05-01 NOTE — ASSESSMENT & PLAN NOTE
-  Patient with LUE and LLE weakness  - CT brain showing subacute infarct in R corona radiata, questionable subacute lacunar infarct.  Old lateral lacunar infarcts.   - Carotid US negative for significant stenosis  - Neuro checks q 4 hours  - MRI, ECHO, and Lipid panel pending  - Neuro consult pending  - Continue home ASA, Statin, and Plavix  - PT/OT/ST to eval and treat  - Control BP Goal <140/80  - Supportive care  -  consult for DC planning

## 2018-05-01 NOTE — CONSULTS
[facility]   Neurology  Consult Note    Patient Name: Ryland Ortez Jr.  MRN: [id number]  Admission Date: 4/30/2018  Hospital Length of Stay: 0 days  Code Status: Full Code   Attending Provider: Héctor Loco MD   Consulting Provider: Efe Rodriguez MD  Primary Care Physician: Janina Emerson MD  Principal Problem:Cerebrovascular accident (CVA)    Consults  Subjective:     Chief Complaint:  Weakness and fall at home     HPI: The patient states that he had fallen at home and was unable to get up from the floor.  He states that he did not call for help, but was eventually found and helped to his bed.  He states that he was aware of left sided weakness, and because of the change, was brought to the hospital for further care.    Prior to the fall, the patient states that he was able to ambulate at household level with rollator, but note is made of the finding of pressure skin breakdown/ulceration over the sacrum and left posterior thigh that was present on admission, suggestive of prolonged inactivity.     At time of this consult, the patient denies any headache or dizziness.  He denies any numbness, tingling or other paresthesiae.  He is not aware of any vision loss or diplopia.  He states that the can swallow without difficulty,and is able to communicate his wants and needs.     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    Overactive bladder     Peripheral vascular disease 1/20/2016    Prediabetes     PVD (peripheral vascular disease) 7/8/2013    Stroke        Past Surgical  History:   Procedure Laterality Date    ARTERIAL ANEURYSM REPAIR      AAA repair    CAROTID ARTERY ANGIOPLASTY      COLONOSCOPY N/A 10/9/2015    Procedure: COLONOSCOPY;  Surgeon: Dedrick Brownlee MD;  Location: [facility] ENDO;  Service: Endoscopy;  Laterality: N/A;    COLONOSCOPY N/A 11/24/2015    Procedure: COLONOSCOPY;  Surgeon: Dedrick Brownlee MD;  Location: [facility] ENDO;  Service: Endoscopy;  Laterality: N/A;    CORONARY ANGIOPLASTY WITH STENT PLACEMENT      2010?    ROTATOR CUFF REPAIR      Right       Review of patient's allergies indicates:  No Known Allergies    Current Neurological Medications: See below    No current facility-administered medications on file prior to encounter.      Current Outpatient Prescriptions on File Prior to Encounter   Medication Sig    adalimumab (HUMIRA) 40 mg/0.8 mL injection Inject 0.8 mLs (40 mg total) into the skin every 7 days.    amlodipine (NORVASC) 5 MG tablet Take 5 mg by mouth once daily.    atorvastatin (LIPITOR) 10 MG tablet Take 1 tablet (10 mg total) by mouth once daily.    clotrimazole (LOTRIMIN) 1 % cream Apply to affected area 2 times daily    ferrous sulfate 325 mg (65 mg iron) Tab tablet Take 1 tablet (325 mg total) by mouth once daily.    finasteride (PROSCAR) 5 mg tablet Take 1 tablet (5 mg total) by mouth once daily.    glipiZIDE (GLUCOTROL) 5 MG tablet Take 5 mg by mouth daily with dinner or evening meal. Pt takes 2.5 mg at night    isosorbide dinitrate (ISORDIL) 40 MG Tab Take 20 mg by mouth 3 (three) times daily.    lisinopril (PRINIVIL,ZESTRIL) 40 MG tablet Take 40 mg by mouth once daily.    mesalamine (CANASA) 1000 MG Supp Place 500 mg rectally 2 (two) times daily.    metoprolol succinate (TOPROL-XL) 25 MG 24 hr tablet Take 0.5 tablets (12.5 mg total) by mouth once daily.    oxycodone-acetaminophen (PERCOCET) 5-325 mg per tablet Take 1 tablet by mouth every 4 (four) hours as needed for Pain.    pantoprazole (PROTONIX) 40 MG tablet Take  1 tablet (40 mg total) by mouth once daily.    tamsulosin (FLOMAX) 0.4 mg Cp24 Take 1 capsule (0.4 mg total) by mouth once daily.      Family History     Problem Relation (Age of Onset)    Diabetes Mother    Hypertension Mother    No Known Problems Father        Social History Main Topics    Smoking status: Former Smoker     Packs/day: 1.00     Years: 40.00     Quit date: 7/18/2008    Smokeless tobacco: Not on file    Alcohol use No    Drug use: No    Sexual activity: Not on file     Review of Systems   ROS is not felt to be reliable and family is not in the room    Objective:     Vital Signs (Most Recent):  Temp: 98.4 °F (36.9 °C) (05/01/18 1604)  Pulse: 82 (05/01/18 1604)  Resp: 20 (05/01/18 1604)  BP: 137/79 (05/01/18 1604)  SpO2: 95 % (05/01/18 1604) Vital Signs (24h Range):  Temp:  [97.5 °F (36.4 °C)-98.6 °F (37 °C)] 98.4 °F (36.9 °C)  Pulse:  [75-84] 82  Resp:  [14-20] 20  SpO2:  [88 %-98 %] 95 %  BP: (137-192)/() 137/79     Weight: 68.6 kg (151 lb 3.8 oz)  Body mass index is 22.33 kg/m².    Physical Exam   APPEARANCE: Well nourished, well developed, in no acute distress.    HEAD: Normocephalic, atraumatic.  EYES: PERRL. EOMI.  Non-icteric sclerae.    EARS: TM's intact. Light reflex normal. No retraction or perforation.    NOSE: Mucosa pink. Airway clear.  MOUTH & THROAT: No tonsillar enlargement.  Mucous membranes moist.  NECK: Supple. No bruits.  CHEST: Lungs clear to auscultation.  CARDIOVASCULAR: Regular rhythm without significant murmurs.  MUSCULOSKELETAL:  No bony deformity seen. See wound care report re sacral, left heel and left posterior thigh skin breakdowns/ulcers.  NEUROLOGIC:   Mental Status:  The patient is well oriented to person, but could not name day, date, month.  He knew he was in hospital, but not certain how long he had been here.  He was able to follow one step commands easily.  The patient is attentive to the environment and cooperative for the exam.  Cranial Nerves: II-XII  grossly intact. Fundoscopic exam is normal.  No hemorrhage, exudate or papilledema is present. The extraocular muscles are intact in the cardinal directions of gaze.  No ptosis is present. Facial features are symmetrical.  Speech is normal in fluency, diction, and phrasing.  Tongue protrudes in the midline.    Gait and Station:  The patient is confined to bed.  Motor:  No downdrift of either arm when held at shoulder level.  There is slight tremulousness of the left hand and fingers (effort tremor).  He is able to move the left foot and ankle,but right side is stronger.  With gravity eliminated, he has 2/5 knee/hip extension on the left.  Sensory:  Patient responds to pin prick bilaterally, both arms and legs.  Cerebellar:  Finger to nose done well on the right, slight intention tremor on the left.  Reflexes:  Stretch reflexes are 1+ both upper and lower extremities.  Plantar stimulation is flexor bilaterally and no pathological reflexes are seen              Significant Labs:   CBC:   Recent Labs  Lab 04/30/18 2135 05/01/18  0530   WBC 9.45 8.79   HGB 10.8* 9.7*   HCT 34.4* 30.9*    186     CMP:   Recent Labs  Lab 04/30/18 2135 05/01/18  0530   * 117*    143   K 3.4* 3.2*    110   CO2 29 28   BUN 31* 28*   CREATININE 1.8* 1.6*   CALCIUM 8.4* 8.1*   MG 1.8 1.9   PROT 7.2  --    ALBUMIN 2.3* 2.0*   BILITOT 0.4  --    ALKPHOS 69  --    AST 37  --    ALT 27  --    ANIONGAP 7* 5*   EGFRNONAA 36* 41*     All pertinent lab results from the past 24 hours have been reviewed.    Significant Imaging: I have reviewed all pertinent imaging results/findings within the past 24 hours.  MRI shows very small area, right cerebral peduncle along with small area of old stroke in the same area.  Generalized atrophy and white matter changes also seen.    Assessment and Plan:     1.  Small right cerebral infarct in patient with prior history of stroke and multiple risk factors for stroke,including HTN,  hyperlipidemia, CKD  2.  Generalized deconditioning due to prolonged inactivity    RECOMMENDATIONS  Agree with current work up and plans, including trial of rehab.  If rehab not an option, then he will need SNF placement as wife cannot provide lifting help.  Active Diagnoses:    Diagnosis Date Noted POA    PRINCIPAL PROBLEM:  Cerebrovascular accident (CVA) [I63.9] 05/01/2018 Yes    SOB (shortness of breath) [R06.02] 05/01/2018 Yes    CKD (chronic kidney disease), stage III [N18.3] 05/01/2018 Yes    Essential hypertension [I10] 05/01/2018 Yes    BPH (benign prostatic hyperplasia) [N40.0] 05/01/2018 Yes    Type 2 diabetes mellitus [E11.9] 05/01/2018 Yes    Sleep apnea [G47.30] 05/01/2018 Unknown    Hypokalemia [E87.6] 05/01/2018 Unknown    Moderate malnutrition [E44.0] 05/01/2018 Unknown    Crohn's colitis [K50.10] 04/21/2015 Yes    CAD (coronary artery disease) [I25.10] 07/08/2013 Yes      Problems Resolved During this Admission:    Diagnosis Date Noted Date Resolved POA       VTE Risk Mitigation         Ordered     heparin (porcine) injection 5,000 Units  Every 8 hours      05/01/18 0004     IP VTE HIGH RISK PATIENT  Once      05/01/18 0004          Thank you for your consult. I will follow-up with patient. Please contact us if you have any additional questions.    Efe Rodriguez MD  Neurology  [facility]

## 2018-05-01 NOTE — HOSPITAL COURSE
On 4/30 patient was admitted to OBS secondary to Subacute CVA.  CT head showed Subtle zone of hypodensity in the right corona radiata, questionable subacute lacunar infarct.  Involutional changes.  Microvascular ischemic changes.  Old lateral lacunar infarcts.  No evidence of hemorrhage.  Carotid US showed No clinically significant area of stenosis.  There is a mild amount of atherosclerosis bilaterally.  MRI, ECHO, and Lipid panel pending.  Neuro consult pending.  PT/OT/ST evals pending.  Per d/w PT today, patient will need inpatient rehab.  SW consulted to assist with placement.  Initial UA showed >100 RBCs.  Patient denies hematuria and all bladder complaints.  Renal CT and repeat UA pending.  Patient reports taking ASA 81 mg daily, Plavix 75 mg daily and statin therapy, which have been resumed.  Periods of apnea noted while sleeping; patient reports he uses a CPAP at night for h/o sleep apnea, will order for use here q hs and while napping.  5/2/18 No acute issues overnight. Neuro following. The patient will need rehab placement at discharge. Case management is aware. 5/3/18 No acute issues overnight. The patient was approved to go to [facility] for continued PT/OT/ST. The patient was seen and examined today and deemed stable for discharge. The patient will follow up with his PCP and with Neurology. The patient may need nursing home placement after rehab.

## 2018-05-01 NOTE — SUBJECTIVE & OBJECTIVE
Interval History: No acute events overnight.  Resting comfortably in bed with son at .  All results and POC discussed in detail.  Patient is agreeable to inpatient rehab.  SW consulted to assist with placement.  Left sided weakness persists.  Periods of apnea noted while sleeping; patient reports he uses a CPAP at night for h/o sleep apnea.      Review of Systems   Constitutional: Positive for fatigue. Negative for chills, diaphoresis and fever.   HENT: Negative for hearing loss, mouth sores, sore throat, tinnitus and trouble swallowing.    Eyes: Negative for pain, discharge and redness.   Respiratory: Negative for apnea, cough, choking, chest tightness, shortness of breath, wheezing and stridor.    Cardiovascular: Negative for chest pain, palpitations and leg swelling.   Gastrointestinal: Negative for abdominal distention, abdominal pain, blood in stool, constipation, diarrhea, nausea, rectal pain and vomiting.   Endocrine: Negative for cold intolerance, heat intolerance, polydipsia, polyphagia and polyuria.   Genitourinary: Negative for difficulty urinating, dysuria, flank pain, frequency, hematuria and urgency.   Musculoskeletal: Negative for arthralgias, back pain, gait problem, joint swelling, neck pain and neck stiffness.   Skin: Negative for color change, rash and wound.   Allergic/Immunologic: Negative for food allergies.   Neurological: Positive for weakness. Negative for dizziness, tremors, seizures, syncope, speech difficulty, light-headedness and headaches.        LUE and LLE weakness   Hematological: Negative for adenopathy. Does not bruise/bleed easily.   Psychiatric/Behavioral: Negative for agitation and confusion. The patient is not nervous/anxious.    All other systems reviewed and are negative.    Objective:     Vital Signs (Most Recent):  Temp: 97.6 °F (36.4 °C) (05/01/18 1132)  Pulse: 78 (05/01/18 1132)  Resp: 20 (05/01/18 1132)  BP: (!) 154/80 (05/01/18 1132)  SpO2: (!) 92 % (05/01/18 1132)  Vital Signs (24h Range):  Temp:  [97.5 °F (36.4 °C)-98.6 °F (37 °C)] 97.6 °F (36.4 °C)  Pulse:  [75-84] 78  Resp:  [14-20] 20  SpO2:  [88 %-98 %] 92 %  BP: (154-192)/() 154/80     Weight: 68.6 kg (151 lb 3.8 oz)  Body mass index is 22.33 kg/m².    Intake/Output Summary (Last 24 hours) at 05/01/18 1243  Last data filed at 05/01/18 0700   Gross per 24 hour   Intake               60 ml   Output              251 ml   Net             -191 ml      Physical Exam   Constitutional: He is oriented to person, place, and time. He appears well-developed and well-nourished. No distress.   HENT:   Head: Normocephalic and atraumatic.   Mouth/Throat: Oropharynx is clear and moist.   Eyes: Conjunctivae are normal. Pupils are equal, round, and reactive to light. No scleral icterus.   Neck: No JVD present. No thyromegaly present.   Cardiovascular: Regular rhythm.  Exam reveals no gallop and no friction rub.    No murmur heard.  Pulmonary/Chest: Effort normal and breath sounds normal. No respiratory distress. He has no wheezes. He has no rales.   Abdominal: Soft. Bowel sounds are normal. He exhibits no distension. There is no tenderness. There is no guarding.   Musculoskeletal:   Strength 4/5 to LUE. LLE 0/5.  RUE and RLE 4/5.    Neurological: He is alert and oriented to person, place, and time. No cranial nerve deficit or sensory deficit. GCS eye subscore is 4. GCS verbal subscore is 5. GCS motor subscore is 6.   Follows all commands appropriately.  Speech clear.     Skin: Skin is warm. Capillary refill takes 2 to 3 seconds. He is not diaphoretic. No erythema.   Psychiatric: He has a normal mood and affect.   Nursing note and vitals reviewed.      Significant Labs:   BMP:     Recent Labs  Lab 05/01/18  0530   *      K 3.2*      CO2 28   BUN 28*   CREATININE 1.6*   CALCIUM 8.1*   MG 1.9     CBC:     Recent Labs  Lab 04/30/18  2135 05/01/18  0530   WBC 9.45 8.79   HGB 10.8* 9.7*   HCT 34.4* 30.9*    186      Coagulation:     Recent Labs  Lab 04/30/18 2135   INR 1.1   APTT 36.8*       Magnesium:     Recent Labs  Lab 04/30/18 2135 05/01/18  0530   MG 1.8 1.9     Troponin:     Recent Labs  Lab 04/30/18 2135   TROPONINI 0.058*     TSH:     Recent Labs  Lab 04/30/18 2135   TSH 0.696     Urine Studies:     Recent Labs  Lab 04/30/18 2205   COLORU Other*   APPEARANCEUA Hazy*   PHUR 6.0   SPECGRAV 1.025   PROTEINUA 3+*   GLUCUA Negative   KETONESU Negative   BILIRUBINUA 1+*   OCCULTUA 3+*   NITRITE Negative   UROBILINOGEN 1.0   LEUKOCYTESUR Negative   RBCUA >100*   WBCUA 1   BACTERIA Rare   HYALINECASTS 0       Significant Imaging: I have reviewed all pertinent imaging results/findings within the past 24 hours.

## 2018-05-01 NOTE — SUBJECTIVE & OBJECTIVE
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    Overactive bladder     Peripheral vascular disease 1/20/2016    Prediabetes     PVD (peripheral vascular disease) 7/8/2013    Stroke        Past Surgical History:   Procedure Laterality Date    ARTERIAL ANEURYSM REPAIR      AAA repair    CAROTID ARTERY ANGIOPLASTY      COLONOSCOPY N/A 10/9/2015    Procedure: COLONOSCOPY;  Surgeon: Dedrick Brownlee MD;  Location: [facility];  Service: Endoscopy;  Laterality: N/A;    COLONOSCOPY N/A 11/24/2015    Procedure: COLONOSCOPY;  Surgeon: Dedrick Brownlee MD;  Location: [facility];  Service: Endoscopy;  Laterality: N/A;    CORONARY ANGIOPLASTY WITH STENT PLACEMENT      2010?    ROTATOR CUFF REPAIR      Right       Review of patient's allergies indicates:  Not on File    No current facility-administered medications on file prior to encounter.      Current Outpatient Prescriptions on File Prior to Encounter   Medication Sig    adalimumab (HUMIRA) 40 mg/0.8 mL injection Inject 0.8 mLs (40 mg total) into the skin every 7 days.    amlodipine (NORVASC) 5 MG tablet Take 5 mg by mouth once daily.    atorvastatin (LIPITOR) 10 MG tablet Take 1 tablet (10 mg total) by mouth once daily.    clotrimazole (LOTRIMIN) 1 % cream Apply to affected area 2 times daily    ferrous sulfate 325 mg (65 mg iron) Tab tablet Take 1 tablet (325 mg total) by mouth once daily.    finasteride (PROSCAR) 5 mg tablet Take 1 tablet (5 mg total) by mouth once daily.    glipiZIDE (GLUCOTROL) 5 MG tablet Take 5 mg by mouth daily with  dinner or evening meal. Pt takes 2.5 mg at night    isosorbide dinitrate (ISORDIL) 40 MG Tab Take 20 mg by mouth 3 (three) times daily.    lisinopril (PRINIVIL,ZESTRIL) 40 MG tablet Take 40 mg by mouth once daily.    mesalamine (CANASA) 1000 MG Supp Place 500 mg rectally 2 (two) times daily.    metoprolol succinate (TOPROL-XL) 25 MG 24 hr tablet Take 0.5 tablets (12.5 mg total) by mouth once daily.    oxycodone-acetaminophen (PERCOCET) 5-325 mg per tablet Take 1 tablet by mouth every 4 (four) hours as needed for Pain.    pantoprazole (PROTONIX) 40 MG tablet Take 1 tablet (40 mg total) by mouth once daily.    tamsulosin (FLOMAX) 0.4 mg Cp24 Take 1 capsule (0.4 mg total) by mouth once daily.     Family History     Problem Relation (Age of Onset)    Diabetes Mother    Hypertension Mother    No Known Problems Father        Social History Main Topics    Smoking status: Former Smoker     Packs/day: 1.00     Years: 40.00     Quit date: 7/18/2008    Smokeless tobacco: Not on file    Alcohol use No    Drug use: No    Sexual activity: Not on file     Review of Systems   Constitutional: Negative for activity change, appetite change, chills, diaphoresis, fatigue and fever.   HENT: Negative for facial swelling, sore throat, tinnitus and trouble swallowing.    Eyes: Negative for photophobia and visual disturbance.   Respiratory: Positive for shortness of breath. Negative for apnea, cough, chest tightness and wheezing.    Cardiovascular: Negative for chest pain, palpitations and leg swelling.   Gastrointestinal: Negative for abdominal distention, abdominal pain, constipation, diarrhea, nausea and vomiting.   Endocrine: Negative for polydipsia, polyphagia and polyuria.   Genitourinary: Negative for decreased urine volume, dysuria, flank pain, frequency and hematuria.   Musculoskeletal: Negative for arthralgias, back pain, joint swelling, myalgias and neck stiffness.   Skin: Negative for pallor and rash.    Allergic/Immunologic: Negative for immunocompromised state.   Neurological: Positive for weakness. Negative for dizziness, seizures, syncope, numbness and headaches.   Psychiatric/Behavioral: Negative for confusion, hallucinations and suicidal ideas. The patient is not nervous/anxious.    All other systems reviewed and are negative.    Objective:     Vital Signs (Most Recent):  Temp: 98.6 °F (37 °C) (05/01/18 0149)  Pulse: 81 (05/01/18 0149)  Resp: 18 (05/01/18 0149)  BP: (!) 186/96 (05/01/18 0149)  SpO2: 98 % (05/01/18 0149) Vital Signs (24h Range):  Temp:  [98.1 °F (36.7 °C)-98.6 °F (37 °C)] 98.6 °F (37 °C)  Pulse:  [76-83] 81  Resp:  [14-20] 18  SpO2:  [88 %-98 %] 98 %  BP: (171-192)/() 186/96     Weight: 68.6 kg (151 lb 3.8 oz)  Body mass index is 22.33 kg/m².    Physical Exam   Constitutional: He is oriented to person, place, and time. He appears well-developed and well-nourished. No distress.   HENT:   Head: Normocephalic and atraumatic.   Mouth/Throat: Oropharynx is clear and moist.   Eyes: Conjunctivae are normal. Pupils are equal, round, and reactive to light. No scleral icterus.   Neck: No JVD present. No thyromegaly present.   Cardiovascular: Regular rhythm.  Exam reveals no gallop and no friction rub.    No murmur heard.  Pulmonary/Chest: Effort normal and breath sounds normal. No respiratory distress. He has no wheezes. He has no rales.   Abdominal: Soft. Bowel sounds are normal. He exhibits no distension. There is no tenderness. There is no guarding.   Musculoskeletal: Normal range of motion.   MS, 4/5 LLE. 5/5 other extremities    Neurological: He is alert and oriented to person, place, and time. No cranial nerve deficit.   Skin: Skin is warm. Capillary refill takes 2 to 3 seconds. He is not diaphoretic. No erythema.   Psychiatric: He has a normal mood and affect.   Nursing note and vitals reviewed.        CRANIAL NERVES     CN III, IV, VI   Pupils are equal, round, and reactive to light.        Significant Labs:   ABGs: No results for input(s): PH, PCO2, HCO3, POCSATURATED, BE, TOTALHB, COHB, METHB, O2HB, POCFIO2 in the last 48 hours.  BMP:   Recent Labs  Lab 04/30/18 2135   *      K 3.4*      CO2 29   BUN 31*   CREATININE 1.8*   CALCIUM 8.4*   MG 1.8     CBC:   Recent Labs  Lab 04/30/18 2135   WBC 9.45   HGB 10.8*   HCT 34.4*        Lactic Acid:   Recent Labs  Lab 04/30/18 2135   LACTATE 1.2     Magnesium:   Recent Labs  Lab 04/30/18 2135   MG 1.8     Troponin:   Recent Labs  Lab 04/30/18 2135   TROPONINI 0.058*     TSH:   Recent Labs  Lab 04/30/18 2135   TSH 0.696     Urine Studies:   Recent Labs  Lab 04/30/18  2205   COLORU Other*   APPEARANCEUA Hazy*   PHUR 6.0   SPECGRAV 1.025   PROTEINUA 3+*   GLUCUA Negative   KETONESU Negative   BILIRUBINUA 1+*   OCCULTUA 3+*   NITRITE Negative   UROBILINOGEN 1.0   LEUKOCYTESUR Negative   RBCUA >100*   WBCUA 1   BACTERIA Rare   HYALINECASTS 0     All pertinent labs within the past 24 hours have been reviewed.    Significant Imaging: I have reviewed all pertinent imaging results/findings within the past 24 hours.   Imaging Results          CT Head Without Contrast (Final result)  Result time 04/30/18 22:47:27    Final result by Srinivas Gallardo Jr., MD (04/30/18 22:47:27)                 Impression:     Subtle zone of hypodensity in the right corona radiata, questionable subacute lacunar infarct.  Involutional changes.  Microvascular ischemic changes.  Old lateral lacunar infarcts.    All CT scans at this facility use dose modulation, iterative reconstruction, and/or weight based dosing when appropriate to reduce radiation dose to as low as reasonably achievable.        Electronically signed by: SRINIVAS GALLARDO MD  Date:     04/30/18  Time:    22:47              Narrative:    Exam: CT HEAD WITHOUT CONTRAST    History:  Neuro deficit(s), subacute       Technique: Noncontrast axial images of the head were  obtained.    Comparison:04/02/2003    Findings: Bilateral symmetric involutional changes.  Bilateral microvascular ischemic change.      Small zone of hypodensity in the right corona radiata, measuring 1 cm, could correlate with a subacute ischemic lacunar infarct, images 17-18 series 2.  Does patient had a left-sided symptomatology to correlate?      Tiny old appearing lacunar infarcts elsewhere in the right corona radiata and basal ganglia and bilateral thalami.      Ventricular system is normal.  No hydrocephalus.  No midline shift.  No abnormal density to indicate acute major vascular distribution ischemic infarction or hemorrhage.  No mass effect.  No extra-axial fluid collections.     Visualized paranasal sinuses and mastoid air cells appear clear.      No calvarial fracture.    Vascular calcifications.                             X-Ray Chest AP Portable (Final result)  Result time 04/30/18 21:12:52    Final result by Srinivas Gallardo Jr., MD (04/30/18 21:12:52)                 Impression:          As above.      Electronically signed by: SRINIVAS GALLARDO MD  Date:     04/30/18  Time:    21:12              Narrative:    EXAM:   XR CHEST AP PORTABLE    CLINICAL HISTORY:   Weakness    COMPARISON:  01/19/2016    FINDINGS:   Heart size is normal. Benign linear streak in the right lung base likely some platelike atelectasis.  Linear-appearing marking and/or pleural thickening in the right midlung zone.  Some mild pulmonary edema is not excluded.  The dense alveolar infiltrates can be appreciated.  No large effusions.

## 2018-05-01 NOTE — ED PROVIDER NOTES
"SCRIBE #1 NOTE: I, Maryjane Jerry, am scribing for, and in the presence of, Theo Robles MD. I have scribed the entire note.      History      Chief Complaint   Patient presents with    Weakness     L sided weakness that started 3-4 days ago, bilateral lower extremity swelling, and shortness of breath    Fall     multiple falls over the last couple of days       Review of patient's allergies indicates:  No Known Allergies     HPI   HPI    4/30/2018, 8:46 PM   History obtained from the adult caretaker and patient      History of Present Illness: Ryland Ortez Jr. is a 78 y.o. male patient who presents to the Emergency Department for L sided weakness which onset gradually a week ago. Per caretaker, patient was previously able to ambulate with a walker, but in the last week has been unable to.  Symptoms are constant and moderate in severity. The patient describes the sxs as "stiffness". No mitigating or exacerbating factors reported. Patient also c/o SOB with exertion. Patient reports 3 falls in the last week due to loss of balance. Patient denies any sxs prior to falls. Patient denies any fever, chills, HA, dizziness, LOC,  CP, change in appetite, bruises/bleeds easily, back pain, hip pain, abd pain, N/V/D, hematochezia, and all other sxs at this time. Patient reports being on Plavix. No further complaints or concerns at this time.     Arrival mode: Personal vehicle      PCP: Janina Emerson MD       Past Medical History:  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    Overactive bladder     Peripheral " vascular disease 1/20/2016    Prediabetes     PVD (peripheral vascular disease) 7/8/2013    Stroke        Past Surgical History:  Past Surgical History:   Procedure Laterality Date    ARTERIAL ANEURYSM REPAIR      AAA repair    CAROTID ARTERY ANGIOPLASTY      COLONOSCOPY N/A 10/9/2015    Procedure: COLONOSCOPY;  Surgeon: Dedrick Brownlee MD;  Location: [facility] ENDO;  Service: Endoscopy;  Laterality: N/A;    COLONOSCOPY N/A 11/24/2015    Procedure: COLONOSCOPY;  Surgeon: Dedrick Brownlee MD;  Location: [facility] ENDO;  Service: Endoscopy;  Laterality: N/A;    CORONARY ANGIOPLASTY WITH STENT PLACEMENT      2010?    ROTATOR CUFF REPAIR      Right         Family History:  Family History   Problem Relation Age of Onset    Hypertension Mother     Diabetes Mother     No Known Problems Father     Colon cancer Neg Hx     Stomach cancer Neg Hx        Social History:  Social History     Social History Main Topics    Smoking status: Former Smoker     Packs/day: 1.00     Years: 40.00     Quit date: 7/18/2008    Smokeless tobacco: Unknown    Alcohol use No    Drug use: No    Sexual activity: Unknown       ROS   Review of Systems   Constitutional: Negative for appetite change, chills and fever.   HENT: Negative for sore throat.    Respiratory: Positive for shortness of breath.    Cardiovascular: Negative for chest pain.   Gastrointestinal: Negative for abdominal pain, blood in stool, diarrhea, nausea and vomiting.   Genitourinary: Negative for dysuria and hematuria.   Musculoskeletal: Negative for back pain.        (-) hip pain   Skin: Negative for rash.   Neurological: Positive for weakness (L sided). Negative for dizziness and headaches.        (-) LOC   Hematological: Does not bruise/bleed easily.   All other systems reviewed and are negative.      Physical Exam      Initial Vitals [04/30/18 1930]   BP Pulse Resp Temp SpO2   (!) 192/88 76 20 98.1 °F (36.7 °C) 97 %      MAP       122.67          Physical  "Exam  Nursing Notes and Vital Signs Reviewed.  Constitutional: Patient is in no acute distress. Well-developed and well-nourished.  Head: Atraumatic. Normocephalic.  Eyes: PERRL. EOM intact. Conjunctivae are not pale. No scleral icterus.  ENT: Mucous membranes are moist. Oropharynx is clear and symmetric.    Neck: Supple. Full ROM. No lymphadenopathy.  Cardiovascular: Regular rate. Regular rhythm. No murmurs, rubs, or gallops. Distal pulses are 2+ and symmetric.  Pulmonary/Chest: No respiratory distress. Clear to auscultation bilaterally. No wheezing or rales.  Abdominal: Soft and non-distended.  There is no tenderness.  No rebound, guarding, or rigidity. Good bowel sounds.  Genitourinary: No CVA tenderness  Musculoskeletal: Moves all extremities. No obvious deformities. 2+ edema. No calf tenderness.  Skin: Warm and dry.  Neurological: Patient is alert and oriented to person, place and time. Pupils ERRL and EOM normal. Cranial nerves II-XII are intact. Strength is full bilaterally; it is equal and 5/5 in bilateral upper. 4/5 weakness in LLE. There is no pronator drift of outstretched arms. Light touch sense is intact. Speech is clear and normal. No acute focal neurological deficits noted.  Psychiatric: Normal affect. Good eye contact. Appropriate in content.    ED Course    Procedures  ED Vital Signs:  Vitals:    04/30/18 1930 04/30/18 2101 04/30/18 2147 04/30/18 2232   BP: (!) 192/88   (!) 189/146   Pulse: 76 80 76 83   Resp: 20   14   Temp: 98.1 °F (36.7 °C)      TempSrc: Oral      SpO2: 97%   (!) 88%   Weight:       Height: 5' 9" (1.753 m)       04/30/18 2305 04/30/18 2332 05/01/18 0031 05/01/18 0101   BP: (!) 183/99 (!) 192/103 (!) 171/111 (!) 186/111   Pulse: 76 76 78 78   Resp: 17 16 19 20   Temp:       TempSrc:       SpO2: 97% (!) 93% (!) 94% 95%   Weight:       Height:        05/01/18 0149 05/01/18 0151 05/01/18 0208   BP: (!) 186/96     Pulse: 81     Resp: 18     Temp: 98.6 °F (37 °C)     TempSrc: Oral   " "  SpO2: 98%     Weight:  71.2 kg (156 lb 15.5 oz) 68.6 kg (151 lb 3.8 oz)   Height:  5' 9" (1.753 m)        Abnormal Lab Results:  Labs Reviewed   CBC W/ AUTO DIFFERENTIAL - Abnormal; Notable for the following:        Result Value    RBC 4.24 (*)     Hemoglobin 10.8 (*)     Hematocrit 34.4 (*)     MCV 81 (*)     MCH 25.5 (*)     MCHC 31.4 (*)     RDW 16.5 (*)     Gran% 76.9 (*)     Lymph% 13.4 (*)     All other components within normal limits   COMPREHENSIVE METABOLIC PANEL - Abnormal; Notable for the following:     Potassium 3.4 (*)     Glucose 121 (*)     BUN, Bld 31 (*)     Creatinine 1.8 (*)     Calcium 8.4 (*)     Albumin 2.3 (*)     Anion Gap 7 (*)     eGFR if  41 (*)     eGFR if non  36 (*)     All other components within normal limits   APTT - Abnormal; Notable for the following:     aPTT 36.8 (*)     All other components within normal limits   CK - Abnormal; Notable for the following:      (*)     All other components within normal limits   URINALYSIS - Abnormal; Notable for the following:     Color, UA Other (*)     Appearance, UA Hazy (*)     Protein, UA 3+ (*)     Bilirubin (UA) 1+ (*)     Occult Blood UA 3+ (*)     All other components within normal limits   TROPONIN I - Abnormal; Notable for the following:     Troponin I 0.058 (*)     All other components within normal limits   URINALYSIS MICROSCOPIC - Abnormal; Notable for the following:     RBC, UA >100 (*)     All other components within normal limits   B-TYPE NATRIURETIC PEPTIDE - Abnormal; Notable for the following:     BNP 2,691 (*)     All other components within normal limits   PROTIME-INR   TSH   LACTIC ACID, PLASMA   PHOSPHORUS   MAGNESIUM   MAGNESIUM   PHOSPHORUS   HEMOGLOBIN A1C        All Lab Results:  Results for orders placed or performed during the hospital encounter of 04/30/18   CBC auto differential   Result Value Ref Range    WBC 9.45 3.90 - 12.70 K/uL    RBC 4.24 (L) 4.60 - 6.20 M/uL    " Hemoglobin 10.8 (L) 14.0 - 18.0 g/dL    Hematocrit 34.4 (L) 40.0 - 54.0 %    MCV 81 (L) 82 - 98 fL    MCH 25.5 (L) 27.0 - 31.0 pg    MCHC 31.4 (L) 32.0 - 36.0 g/dL    RDW 16.5 (H) 11.5 - 14.5 %    Platelets 194 150 - 350 K/uL    MPV 9.9 9.2 - 12.9 fL    Gran # (ANC) 7.3 1.8 - 7.7 K/uL    Lymph # 1.3 1.0 - 4.8 K/uL    Mono # 0.7 0.3 - 1.0 K/uL    Eos # 0.2 0.0 - 0.5 K/uL    Baso # 0.06 0.00 - 0.20 K/uL    Gran% 76.9 (H) 38.0 - 73.0 %    Lymph% 13.4 (L) 18.0 - 48.0 %    Mono% 7.3 4.0 - 15.0 %    Eosinophil% 1.8 0.0 - 8.0 %    Basophil% 0.6 0.0 - 1.9 %    Differential Method Automated    Comprehensive metabolic panel   Result Value Ref Range    Sodium 144 136 - 145 mmol/L    Potassium 3.4 (L) 3.5 - 5.1 mmol/L    Chloride 108 95 - 110 mmol/L    CO2 29 23 - 29 mmol/L    Glucose 121 (H) 70 - 110 mg/dL    BUN, Bld 31 (H) 8 - 23 mg/dL    Creatinine 1.8 (H) 0.5 - 1.4 mg/dL    Calcium 8.4 (L) 8.7 - 10.5 mg/dL    Total Protein 7.2 6.0 - 8.4 g/dL    Albumin 2.3 (L) 3.5 - 5.2 g/dL    Total Bilirubin 0.4 0.1 - 1.0 mg/dL    Alkaline Phosphatase 69 55 - 135 U/L    AST 37 10 - 40 U/L    ALT 27 10 - 44 U/L    Anion Gap 7 (L) 8 - 16 mmol/L    eGFR if African American 41 (A) >60 mL/min/1.73 m^2    eGFR if non African American 36 (A) >60 mL/min/1.73 m^2   Protime-INR   Result Value Ref Range    Prothrombin Time 10.9 9.0 - 12.5 sec    INR 1.1 0.8 - 1.2   APTT   Result Value Ref Range    aPTT 36.8 (H) 21.0 - 32.0 sec   CPK   Result Value Ref Range     (H) 20 - 200 U/L   TSH   Result Value Ref Range    TSH 0.696 0.400 - 4.000 uIU/mL   Lactic acid, plasma   Result Value Ref Range    Lactate (Lactic Acid) 1.2 0.5 - 2.2 mmol/L   Phosphorus   Result Value Ref Range    Phosphorus 2.9 2.7 - 4.5 mg/dL   Magnesium   Result Value Ref Range    Magnesium 1.8 1.6 - 2.6 mg/dL   Urinalysis   Result Value Ref Range    Specimen UA Urine, Catheterized     Color, UA Other (A) Yellow, Straw, Csaie    Appearance, UA Hazy (A) Clear    pH, UA 6.0 5.0 -  8.0    Specific Gravity, UA 1.025 1.005 - 1.030    Protein, UA 3+ (A) Negative    Glucose, UA Negative Negative    Ketones, UA Negative Negative    Bilirubin (UA) 1+ (A) Negative    Occult Blood UA 3+ (A) Negative    Nitrite, UA Negative Negative    Urobilinogen, UA 1.0 <2.0 EU/dL    Leukocytes, UA Negative Negative   Troponin I   Result Value Ref Range    Troponin I 0.058 (H) 0.000 - 0.026 ng/mL   Urinalysis Microscopic   Result Value Ref Range    RBC, UA >100 (H) 0 - 4 /hpf    WBC, UA 1 0 - 5 /hpf    Bacteria, UA Rare None-Occ /hpf    Hyaline Casts, UA 0 0-1/lpf /lpf    Microscopic Comment SEE COMMENT    Brain natriuretic peptide   Result Value Ref Range    BNP 2,691 (H) 0 - 99 pg/mL       Imaging Results:  Imaging Results          CT Head Without Contrast (Final result)  Result time 04/30/18 22:47:27    Final result by Srinivas Gallardo Jr., MD (04/30/18 22:47:27)                 Impression:     Subtle zone of hypodensity in the right corona radiata, questionable subacute lacunar infarct.  Involutional changes.  Microvascular ischemic changes.  Old lateral lacunar infarcts.    All CT scans at this facility use dose modulation, iterative reconstruction, and/or weight based dosing when appropriate to reduce radiation dose to as low as reasonably achievable.        Electronically signed by: SRINIVAS GALLARDO MD  Date:     04/30/18  Time:    22:47              Narrative:    Exam: CT HEAD WITHOUT CONTRAST    History:  Neuro deficit(s), subacute       Technique: Noncontrast axial images of the head were obtained.    Comparison:04/02/2003    Findings: Bilateral symmetric involutional changes.  Bilateral microvascular ischemic change.      Small zone of hypodensity in the right corona radiata, measuring 1 cm, could correlate with a subacute ischemic lacunar infarct, images 17-18 series 2.  Does patient had a left-sided symptomatology to correlate?      Tiny old appearing lacunar infarcts elsewhere in the right corona  radiata and basal ganglia and bilateral thalami.      Ventricular system is normal.  No hydrocephalus.  No midline shift.  No abnormal density to indicate acute major vascular distribution ischemic infarction or hemorrhage.  No mass effect.  No extra-axial fluid collections.     Visualized paranasal sinuses and mastoid air cells appear clear.      No calvarial fracture.    Vascular calcifications.                             X-Ray Chest AP Portable (Final result)  Result time 04/30/18 21:12:52    Final result by Srinivas Gallardo Jr., MD (04/30/18 21:12:52)                 Impression:          As above.      Electronically signed by: SRINIVAS GALLARDO MD  Date:     04/30/18  Time:    21:12              Narrative:    EXAM:   XR CHEST AP PORTABLE    CLINICAL HISTORY:   Weakness    COMPARISON:  01/19/2016    FINDINGS:   Heart size is normal. Benign linear streak in the right lung base likely some platelike atelectasis.  Linear-appearing marking and/or pleural thickening in the right midlung zone.  Some mild pulmonary edema is not excluded.  The dense alveolar infiltrates can be appreciated.  No large effusions.                             The EKG was ordered, reviewed, and independently interpreted by the ED provider.  Interpretation time: 2028  Rate: 75 BPM  Rhythm: normal sinus rhythm  Interpretation: Possible left atrial enlargement. Left ventricular hypertrophy with repolarization abnormality. No STEMI.         The Emergency Provider reviewed the vital signs and test results, which are outlined above.    ED Discussion     11:53 PM: Discussed case with Dr. Mckoy (Hospital Medicine), agrees with current care and management of pt and accepts admission.   Admitting Service: Hospital medicine   Admitting Physician: Dr. Mckoy  Admit to: Obs    12:13 AM: Re-evaluated pt. I have discussed test results, shared treatment plan, and the need for admission with patient and family at bedside. Pt and family express understanding at  this time and agree with all information. All questions answered. Pt and family have no further questions or concerns at this time. Pt is ready for admit.      ED Medication(s):  Medications   finasteride tablet 5 mg (not administered)   tamsulosin 24 hr capsule 0.4 mg (not administered)   amLODIPine tablet 5 mg (not administered)   isosorbide dinitrate tablet 20 mg (not administered)   lisinopril tablet 40 mg (not administered)   mesalamine suppository 500 mg (not administered)   sodium chloride 0.9% flush 5 mL (not administered)   glucose chewable tablet 16 g (not administered)   glucose chewable tablet 24 g (not administered)   dextrose 50% injection 12.5 g (not administered)   dextrose 50% injection 25 g (not administered)   glucagon (human recombinant) injection 1 mg (not administered)   heparin (porcine) injection 5,000 Units (not administered)   acetaminophen tablet 650 mg (not administered)   insulin aspart U-100 pen 1-10 Units (not administered)   albuterol-ipratropium 2.5mg-0.5mg/3mL nebulizer solution 3 mL (not administered)   hydrALAZINE injection 10 mg (not administered)   arformoterol nebulizer solution 15 mcg (not administered)   budesonide nebulizer solution 0.25 mg (not administered)   aspirin EC tablet 81 mg (not administered)   atorvastatin tablet 40 mg (not administered)   magnesium sulfate in dextrose IVPB (premix) 1 g (1 g Intravenous New Bag 5/1/18 0051)       Current Discharge Medication List                Medical Decision Making    Medical Decision Making:   Clinical Tests:   Lab Tests: Ordered and Reviewed  Radiological Study: Ordered and Reviewed  Medical Tests: Ordered and Reviewed           Scribe Attestation:   Scribe #1: I performed the above scribed service and the documentation accurately describes the services I performed. I attest to the accuracy of the note.    Attending:   Physician Attestation Statement for Scribe #1: I, Theo Robles MD, personally performed the services  described in this documentation, as scribed by Maryjane Edwards, in my presence, and it is both accurate and complete.          Clinical Impression       ICD-10-CM ICD-9-CM   1. Cerebrovascular accident (CVA), unspecified mechanism I63.9 434.91   2. Weakness R53.1 780.79   3. CVA (cerebral vascular accident) I63.9 434.91       Disposition:   Disposition: Placed in Observation  Condition: Fair         Theo Robles MD  05/01/18 0559

## 2018-05-01 NOTE — PT/OT/SLP PROGRESS
Physical Therapy      Patient Name:  Ryland Ortez Jr.   MRN:  [id number]    DUSTIN JONES INITIATED THIS AM VIA CHART REVIEW, PT GETTING CAROTID US AND THEN TO X-RAY, WILL COMPLETE DUSTIN JONES NEXT VISIT    Sona Whittaker, PT   5/1/2018  0808

## 2018-05-01 NOTE — ASSESSMENT & PLAN NOTE
- stable  - UA gross hematuria    - check CT urogram  - continue finasteride, tamsulosin  - monitor I/O

## 2018-05-01 NOTE — CONSULTS
05/01/18 1045   Pain/Comfort Assessments   Pain Assessment Performed Yes       Number Scale   Presence of Pain denies   Skin   Skin WDL ex   Skin Temperature warm   Skin Moisture Dry   Skin Elasticity Sluggish   Jerry Risk Assessment   Sensory Perception 2-->very limited   Moisture 3-->occasionally moist   Activity 1-->bedfast   Mobility 2-->very limited   Nutrition 2-->probably inadequate   Friction and Shear 2-->potential problem   Jerry Score 12       Pressure Injury 05/01/18 0200 Left posterior Thigh Stage 3   Date First Assessed/Time First Assessed: 05/01/18 0200   Pressure Injury Present on Admission: yes  Side: Left  Orientation: posterior  Location: Thigh  Is this injury device related?: No  Staging: (c) Stage 3   Wound Image    Staging 3   Dressing Appearance Moist drainage   Drainage Amount Small   Drainage Characteristics/Odor Serosanguineous   Appearance Purple;Red;Yellow;Moist   Tissue loss description Full thickness   Red (%), Wound Tissue Color 80 %   Yellow (%), Wound Tissue Color 20 %   Periwound Area Intact;Dry   Wound Edges Open   Wound Length (cm) 2.5 cm   Wound Width (cm) 13 cm   Wound Depth (cm) 0.2 cm   Wound Volume (cm^3) 6.5 cm^3   Care Cleansed with:;Sterile normal saline;Applied:;Skin Barrier   Dressing Changed;Hydrofiber;Foam   Periwound Care Absorptive dressing applied;Cleansed with pH balanced cleanser;Dry periwound area maintained;Skin barrier film applied   Dressing Change Due 05/04/18       Pressure Injury 05/01/18 0200 midline Sacral spine Stage 3   Date First Assessed/Time First Assessed: 05/01/18 0200   Pressure Injury Present on Admission: yes  Orientation: midline  Location: Sacral spine  Staging: Stage 3   Wound Image    Staging 3   Dressing Appearance Moist drainage   Drainage Amount Small   Drainage Characteristics/Odor Serosanguineous   Appearance Purple;Red;Yellow;Slough;Moist   Tissue loss description Full thickness   Red (%), Wound Tissue Color 60 %   Yellow (%),  Wound Tissue Color 40 %   Periwound Area Redness;Other (see comments)  (purple DTI evolved into Stage 3)   Wound Edges Open   Wound Length (cm) 7 cm   Wound Width (cm) 4 cm   Wound Depth (cm) 0.2 cm   Wound Volume (cm^3) 5.6 cm^3   Care Cleansed with:;Soap and water;Sterile normal saline;Applied:;Skin Barrier   Dressing Hydrofiber;Silver;Foam   Periwound Care Absorptive dressing applied;Cleansed with pH balanced cleanser;Dry periwound area maintained;Skin barrier film applied   Dressing Change Due 05/04/18   Skin Interventions   Pressure Reduction Devices Pressure-redistributing mattress utilized;Heel offloading device utilized   Pressure Reduction Techniques frequent weight shift encouraged;heels elevated off bed;positioned off wounds;pressure points protected   Skin Protection Adhesive use limited;Foam dressing;Incontinence pads;Skin sealant/moisture barrier;Tubing/devices free from skin contact     Consulted on this 79 y/o M patient due to present on admission pressure injuries. patinet admitted with CVA. He has a PMH significant for CVA, COPD, HTN, PAD< Crohn's disease, and CKD stage 3.  He is currently sleeping, but opens eyes and replies with slow but appropriate verbal response to questions.  When not stimulated, he falls back to sleep quickly.  Bilateral heels assessed. Left heel Stage 1 pressure injury noted with 2x2cm area of nonblanchable redness noted. Painted with cavilon and obtained pillows to room to float bilat heels off mattress.  Patient turned to the right side with assistance.  Left posterior upper thigh/lower buttock with Deep Tissue Injury noted that has evolved into Stage 3 pressure injury.  Wound measures 2.5x13x0.2cm. Appears to be from wheelchair, and patient states he is wheel chair bound at home.  2 areas of DTI have opened and have red and yellow moist wound beds with scant serosanguinous drainage.  Cleansed with saline and patted dry.  Painted with cavilon, and covered with Aquacel  "Foam Pro sacral dressing due to size.  Sacrum/cooygeal region dressing removed and site assessed. This also appears to have originated as a DTI to sacral/coccygeal region that has evolved and opened into Stage 3 pressure injury. Wound measures 7x4x0.25cm with moist red and yellow wound bed and small amount yellow slough. Surrounding tissue deep purple and sloughing off.  Cleansed with saline and patted dry.  Painted duke wound with cavilon.  Aquacel AG extra cut to size and applied to open wound bed, and secured with aquacel foam pro sacral dressing.  Patient then positioned with foam wedge and pillows for offloading of sacrum and heels.  Tolerated wound care well.  He states he spends much of his time at home in wheelchair.  His left side was affected by recent stroke. Discussed need for strict offloading with patient, and due to severe wounds, Specialty SizeWise low air loss bed with lateral rotation therapy ordered for patient.  Please see below for wound care recommendations:    Left heel Stage 1 pressure injury:  1. Cleanse with saline  2. Pat dry  3. Paint with cavilon every shift  4. Elevate heels off mattress on 2 separate pillows placed lengthwise under each leg supporting the leg from knee to ankle.  Document in EPIC flow sheet every 2 hours.    Stage 3 pressure injury to Sacral/Coccygeal region  1. Cleanse with saline  2. Pat dry  3. Paint intact duke wound skin with cavilon  4. Cut Aquacel AG extra to size and apply to open wound  5. Secure with Aquacel Foam pro sacral dressing  6. Change every 3 days    Left posterior Thigh Stage 3/DTI pressure injury:  1. Cleanse with saline  2. Pat dry  3. Paint intact duke wound skin with cavilon  4. Cover with Aquacel Foam pro sacral dressing  5. Change every 3 days    Skin Care Precautions / Pressure Injury Prevention:  1. Follow "Guidelines for Prevention of Pressure Ulcers in At Risk Patients"  These guidelines can be found on the [facility] Intr[employer] by searching " ""Wound Care / Ostomy Resources"  2. Document wound assessment in [facility] using guidelines in Alana's "Assessment : Wound" procedure  3. Limit the amount of linen/underpad between patient and mattress surface to ONE fitted sheet and ONE covidien underpad - NO DIAPERS.  4. Obtain Easi Cleans Foam Wipes for providing duke care - avoid the use of wash cloths to areas affected by IAD.  5. Apply Clear Barrier Ointment to perineal / perirectal areas in a thin even layer to clean dry skin BID and after each episode of pericare  6. Apply sween 24 moisturizer cream to all dry skin after daily bath and prn  7. Obtain foam wedge from materials management to assist with maintaining proper position changes at least q 2hours and document actual position in EPIC q 2hours  8. Elevate heels off mattress on 2 separate pillows placed lengthwise under each leg supporting the leg from knee to ankle.  Document in EPIC flow sheet every 2 hours.  9. .Do NOT elevate HOB greater than 30 degrees unless contraindicated.  10. Remove SCD/Plexi Pulses/KYLEE's every 12 hours for 30 minutes and assess skin underneath these devices for breakdown          "

## 2018-05-01 NOTE — ASSESSMENT & PLAN NOTE
- CXR atelectasis, no significant acute findings  - no si/sx of infection    - significant smoking hx, start duonebs q4h prn sob/wheezes, symbicort q12h  - significant cardiac hx, check BNP, TTE

## 2018-05-01 NOTE — HPI
"78M h/o CVA, COPD, HTN, PAD, CAD, Crohn's disease, and CKD stage III presents with L sided weakness x 1 week.  Reports "stiffiness".  Associated with mechanical falls x 3 and imbalance.  Prior to 1 week ago, patient was able to ambulate with walker without instability.   Also c/o SOB with exertion.  Patient is a former smoker.  He quit in 2008.  Was a 40 pack year smoker.   [facility]  Chart review does not provide further significant history pertaining to HPI.  In ER, CT brain show subacute infarct of right corona radiata, with questionable subacute lacunar infarct, old lacunar infarcts are seen.  Hospital medicine called for admission.        "

## 2018-05-01 NOTE — ASSESSMENT & PLAN NOTE
Malnutrition in the context of Chronic Illness/Injury    Related to (etiology):  Predicted suboptimal energy intake associated with Chron's disease     Signs and Symptoms (as evidenced by):    Body Fat Depletion: mild depletion of orbitals and triceps   Muscle Mass Depletion: mild depletion of temples, clavicle region, scapular region and lower extremities   Weight Loss: weight loss of 14 lbs (8.53 %) within the last 6 months    Interventions/Recommendations (treatment strategy):  See above     Nutrition Diagnosis Status:  New

## 2018-05-01 NOTE — ASSESSMENT & PLAN NOTE
- BP under fair control  - Continue Amlodipine, Isordil, Lisinopril, and Metoprolol at home dose  - Monitor

## 2018-05-01 NOTE — PLAN OF CARE
Met with patient to complete discharge planning assessment. Patient lives with his wife, Lila. He understands Rehab is recommended at discharge and is in agreement. Patient signed preference form for first accepting Rehab facility. Report given to Cuong Mckeon RN.     05/01/18 1400   Discharge Assessment   Assessment Type Discharge Planning Assessment   Confirmed/corrected address and phone number on facesheet? Yes   Assessment information obtained from? Patient   Prior to hospitilization cognitive status: Alert/Oriented   Current cognitive status: Alert/Oriented   Current Functional Status: Needs Assistance   Lives With spouse   Able to Return to Prior Arrangements no   Is patient able to care for self after discharge? Yes   Who are your caregiver(s) and their phone number(s)? Wife Lila [phone]   Readmission Within The Last 30 Days no previous admission in last 30 days   Equipment Currently Used at Home rollator;shower chair;cane, straight;wheelchair   Does the patient have transportation home? Yes   Discharge Plan A Rehab   Patient/Family In Agreement With Plan yes

## 2018-05-01 NOTE — PT/OT/SLP EVAL
Occupational Therapy   Evaluation    Name: Ryland Ortez Jr.  MRN: [id number]  Admitting Diagnosis:  Cerebrovascular accident (CVA)      Recommendations:     Discharge Recommendations: rehabilitation facility  Discharge Equipment Recommendations:  none  Barriers to discharge:  None    History:     Occupational Profile:  Living Environment: lives with spouse in 1 story house with no steps to enter  Previous level of function: (I) with adl's  Roles and Routines: OCCUPATIONAL THERAPY  Equipment Owned:  shower chair, rollator, wheelchair  Assistance upon Discharge:     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    Overactive bladder     Peripheral vascular disease 1/20/2016    Prediabetes     PVD (peripheral vascular disease) 7/8/2013    Stroke        Past Surgical History:   Procedure Laterality Date    ARTERIAL ANEURYSM REPAIR      AAA repair    CAROTID ARTERY ANGIOPLASTY      COLONOSCOPY N/A 10/9/2015    Procedure: COLONOSCOPY;  Surgeon: Dedrick Brownlee MD;  Location: [facility] ENDO;  Service: Endoscopy;  Laterality: N/A;    COLONOSCOPY N/A 11/24/2015    Procedure: COLONOSCOPY;  Surgeon: Dedrick Brownlee MD;  Location: [facility];  Service: Endoscopy;  Laterality: N/A;    CORONARY ANGIOPLASTY WITH STENT PLACEMENT      2010?    ROTATOR CUFF REPAIR      Right       Subjective     Chief Complaint: L SIDE WEAKNESS, IMPAIRED FUNCTIONAL MOBIKLITY AND T/F'S  Patient/Family stated goals:   Communicated with: NURSE MATIAS AND EPIC CHART REVIEW prior to session.  Pain/Comfort:  · Pain Rating 1:  2/10  · Location - Side 1: Left    Patients cultural, spiritual, Buddhist conflicts given the current situation:      Objective:     Patient found with: telemetry    General Precautions: Standard, fall   Orthopedic Precautions:N/A   Braces: N/A     Occupational Performance:    Bed Mobility:    · Patient completed Rolling/Turning to Left with  moderate assistance  · Patient completed Rolling/Turning to Right with moderate assistance  · Patient completed Scooting/Bridging with moderate assistance  · Patient completed Supine to Sit with moderate assistance  · Patient completed Sit to Supine with moderate assistance    Functional Mobility/Transfers:  · Patient completed Sit <> Stand Transfer with maximal assistance  with  X1   · Patient completed Bed <> Chair Transfer using Squat Pivot technique with total assistance with X2  · Functional Mobility: NA    Activities of Daily Living:  · UB Dressing: maximal assistance X1  · LB Dressing: maximal assistance X1  · Toileting: total assistance X1    Cognitive/Visual Perceptual:  Cognitive/Psychosocial Skills:     -       Oriented to: Person, Place, Time and Situation   -       Follows Commands/attention:Follows one-step commands  -       Communication: clear/fluent  -       Memory: No Deficits noted  -       Safety awareness/insight to disability: impaired   Visual/Perceptual:      -Intact    Physical Exam:  Upper Extremity Range of Motion:     -       Right Upper Extremity: Deficits: SHOULDER APPROX 90 DEGREES  -       Left Upper Extremity: Deficits: SHOULDER APPROX 90 DEGREES  Upper Extremity Strength:    -       Right Upper Extremity: Deficits: MMT: 3/5 GROSSLY  -       Left Upper Extremity: Deficits: MMT: 3/5 GROSSLY   Strength:    -       Right Upper Extremity: WFL  -       Left Upper Extremity: WFL    Patient left IN WC WITH MRI with all lines intact, call button in reach, RADIOLOGY TECH PRESENT present and PT IN WC GOING FOR MRI    [facility] 6 Click:  [facility] Total  "Score: 12    Treatment & Education:    Education:    Assessment:     Ryland Ortez Jr. is a 78 y.o. male with a medical diagnosis of Cerebrovascular accident (CVA).  He presents with performance deficits affecting function are weakness, impaired functional mobilty, decreased safety awareness, impaired endurance, gait instability, impaired balance, impaired self care skills, decreased lower extremity function.      Rehab Prognosis:  FAIR+; patient would benefit from acute skilled OT services to address these deficits and reach maximum level of function.         Clinical Decision Makin.  OT Mod:  "Pt evaluation falls under moderate complexity for evaluation coding due to identification of 3-5 performance deficits noted as stated above. Eval required Min/Mod assistance to complete on this date and detailed assessment(s) were utilized. Moreover, an expanded review of history and occupational profile obtained with additional review of cognitive, physical and psychosocial hx."     Plan:     Patient to be seen 3 x/week to address the above listed problems via self-care/home management, therapeutic activities, therapeutic exercises  · Plan of Care Expires: 18  · Plan of Care Reviewed with: patient    This Plan of care has been discussed with the patient who was involved in its development and understands and is in agreement with the identified goals and treatment plan    GOALS:    Occupational Therapy Goals        Problem: Occupational Therapy Goal    Goal Priority Disciplines Outcome Interventions   Occupational Therapy Goal     OT, PT/OT     Description:  Goals to be met by: 18      Patient will increase functional independence with ADLs by performing:    UE Dressing with Minimal Assistance.  LE Dressing with Minimal Assistance.  Toilet transfer to bedside commode with Minimal Assistance.  Upper extremity exercise program x15  reps per handout, with min resistance                      Time Tracking: "     OT Date of Treatment: 05/01/18  OT Start Time: 1230  OT Stop Time: 1310  OT Total Time (min): 40 min    Billable Minutes:Evaluation 10 MINUTES  Self Care/Home Management 15 MINUTES    Eli Erwin OT  5/1/2018

## 2018-05-01 NOTE — ASSESSMENT & PLAN NOTE
- CXR atelectasis, no significant acute findings  - No si/sx of infection  - Significant smoking hx  - Continue Duonebs q4h prn sob/wheezes, symbicort q12h  - BNP elevated to 2600  - Lasix 20 mg IVP x 1 today  - ECHO pending  - Supplemental O2 prn to [place] >92%  - Monitor

## 2018-05-01 NOTE — PLAN OF CARE
Problem: Patient Care Overview  Goal: Plan of Care Review  Outcome: Ongoing (interventions implemented as appropriate)  Recommendations     Recommendation/Intervention: 1. Continue current diet. 2. Add beneprotein BID. 3. Add boost plus 1 can daily. 4.  Discussed importance of adequate intake and sources of healthy calories to increase overall calorie intake. 4. Will continue to monitor.   Goals: Meet > 85 % EEN/EPN while admitted  Nutrition Goal Status: new  Communication of RD Recs:  (POC, sticky note)

## 2018-05-01 NOTE — PROGRESS NOTES
RT here with CPAP. Pt is now awake with his family member at the bedside. PT is also with him in the room. Informed patient that he needs to wear CPAP when he naps and at night. Pt agrees.

## 2018-05-01 NOTE — H&P
"[facility] Hospital Medicine  History & Physical    Patient Name: Ryland Ortez Jr.  MRN: [id number]  Admission Date: 4/30/2018  Attending Physician: Zeke Mckoy MD  Primary Care Provider: Janina Emerson MD         Patient information was obtained from patient and ER records.     Subjective:     Principal Problem:Cerebrovascular accident (CVA)    Chief Complaint:   Chief Complaint   Patient presents with    Weakness     L sided weakness that started 3-4 days ago, bilateral lower extremity swelling, and shortness of breath    Fall     multiple falls over the last couple of days        HPI: 78M h/o CVA, COPD, HTN, PAD, CAD, Crohn's disease, and CKD stage III presents with L sided weakness x 1 week.  Reports "stiffiness".  Associated with mechanical falls x 3 and imbalance.  Prior to 1 week ago, patient was able to ambulate with walker without instability.   Also c/o SOB with exertion.  Patient is a former smoker.  He quit in 2008.  Was a 40 pack year smoker.   [facility]  Chart review does not provide further significant history pertaining to HPI.  In ER, CT brain show subacute infarct of right corona radiata, with questionable subacute lacunar infarct, old lacunar infarcts are seen.  Hospital medicine called for admission.          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    Overactive bladder     Peripheral vascular disease 1/20/2016    Prediabetes     PVD (peripheral vascular disease) 7/8/2013    Stroke        Past Surgical History:   Procedure " Laterality Date    ARTERIAL ANEURYSM REPAIR      AAA repair    CAROTID ARTERY ANGIOPLASTY      COLONOSCOPY N/A 10/9/2015    Procedure: COLONOSCOPY;  Surgeon: Dedrick Brownlee MD;  Location: [facility] ENDO;  Service: Endoscopy;  Laterality: N/A;    COLONOSCOPY N/A 11/24/2015    Procedure: COLONOSCOPY;  Surgeon: Dedrick Brownlee MD;  Location: [facility] ENDO;  Service: Endoscopy;  Laterality: N/A;    CORONARY ANGIOPLASTY WITH STENT PLACEMENT      2010?    ROTATOR CUFF REPAIR      Right       Review of patient's allergies indicates:  Not on File    No current facility-administered medications on file prior to encounter.      Current Outpatient Prescriptions on File Prior to Encounter   Medication Sig    adalimumab (HUMIRA) 40 mg/0.8 mL injection Inject 0.8 mLs (40 mg total) into the skin every 7 days.    amlodipine (NORVASC) 5 MG tablet Take 5 mg by mouth once daily.    atorvastatin (LIPITOR) 10 MG tablet Take 1 tablet (10 mg total) by mouth once daily.    clotrimazole (LOTRIMIN) 1 % cream Apply to affected area 2 times daily    ferrous sulfate 325 mg (65 mg iron) Tab tablet Take 1 tablet (325 mg total) by mouth once daily.    finasteride (PROSCAR) 5 mg tablet Take 1 tablet (5 mg total) by mouth once daily.    glipiZIDE (GLUCOTROL) 5 MG tablet Take 5 mg by mouth daily with dinner or evening meal. Pt takes 2.5 mg at night    isosorbide dinitrate (ISORDIL) 40 MG Tab Take 20 mg by mouth 3 (three) times daily.    lisinopril (PRINIVIL,ZESTRIL) 40 MG tablet Take 40 mg by mouth once daily.    mesalamine (CANASA) 1000 MG Supp Place 500 mg rectally 2 (two) times daily.    metoprolol succinate (TOPROL-XL) 25 MG 24 hr tablet Take 0.5 tablets (12.5 mg total) by mouth once daily.    oxycodone-acetaminophen (PERCOCET) 5-325 mg per tablet Take 1 tablet by mouth every 4 (four) hours as needed for Pain.    pantoprazole (PROTONIX) 40 MG tablet Take 1 tablet (40 mg total) by mouth once daily.    tamsulosin (FLOMAX) 0.4 mg  Cp24 Take 1 capsule (0.4 mg total) by mouth once daily.     Family History     Problem Relation (Age of Onset)    Diabetes Mother    Hypertension Mother    No Known Problems Father        Social History Main Topics    Smoking status: Former Smoker     Packs/day: 1.00     Years: 40.00     Quit date: 7/18/2008    Smokeless tobacco: Not on file    Alcohol use No    Drug use: No    Sexual activity: Not on file     Review of Systems   Constitutional: Negative for activity change, appetite change, chills, diaphoresis, fatigue and fever.   HENT: Negative for facial swelling, sore throat, tinnitus and trouble swallowing.    Eyes: Negative for photophobia and visual disturbance.   Respiratory: Positive for shortness of breath. Negative for apnea, cough, chest tightness and wheezing.    Cardiovascular: Negative for chest pain, palpitations and leg swelling.   Gastrointestinal: Negative for abdominal distention, abdominal pain, constipation, diarrhea, nausea and vomiting.   Endocrine: Negative for polydipsia, polyphagia and polyuria.   Genitourinary: Negative for decreased urine volume, dysuria, flank pain, frequency and hematuria.   Musculoskeletal: Negative for arthralgias, back pain, joint swelling, myalgias and neck stiffness.   Skin: Negative for pallor and rash.   Allergic/Immunologic: Negative for immunocompromised state.   Neurological: Positive for weakness. Negative for dizziness, seizures, syncope, numbness and headaches.   Psychiatric/Behavioral: Negative for confusion, hallucinations and suicidal ideas. The patient is not nervous/anxious.    All other systems reviewed and are negative.    Objective:     Vital Signs (Most Recent):  Temp: 98.6 °F (37 °C) (05/01/18 0149)  Pulse: 81 (05/01/18 0149)  Resp: 18 (05/01/18 0149)  BP: (!) 186/96 (05/01/18 0149)  SpO2: 98 % (05/01/18 0149) Vital Signs (24h Range):  Temp:  [98.1 °F (36.7 °C)-98.6 °F (37 °C)] 98.6 °F (37 °C)  Pulse:  [76-83] 81  Resp:  [14-20] 18  SpO2:   [88 %-98 %] 98 %  BP: (171-192)/() 186/96     Weight: 68.6 kg (151 lb 3.8 oz)  Body mass index is 22.33 kg/m².    Physical Exam   Constitutional: He is oriented to person, place, and time. He appears well-developed and well-nourished. No distress.   HENT:   Head: Normocephalic and atraumatic.   Mouth/Throat: Oropharynx is clear and moist.   Eyes: Conjunctivae are normal. Pupils are equal, round, and reactive to light. No scleral icterus.   Neck: No JVD present. No thyromegaly present.   Cardiovascular: Regular rhythm.  Exam reveals no gallop and no friction rub.    No murmur heard.  Pulmonary/Chest: Effort normal and breath sounds normal. No respiratory distress. He has no wheezes. He has no rales.   Abdominal: Soft. Bowel sounds are normal. He exhibits no distension. There is no tenderness. There is no guarding.   Musculoskeletal: Normal range of motion.   MS, 4/5 LLE. 5/5 other extremities    Neurological: He is alert and oriented to person, place, and time. No cranial nerve deficit.   Skin: Skin is warm. Capillary refill takes 2 to 3 seconds. He is not diaphoretic. No erythema.   Psychiatric: He has a normal mood and affect.   Nursing note and vitals reviewed.        CRANIAL NERVES     CN III, IV, VI   Pupils are equal, round, and reactive to light.       Significant Labs:   ABGs: No results for input(s): PH, PCO2, HCO3, POCSATURATED, BE, TOTALHB, COHB, METHB, O2HB, POCFIO2 in the last 48 hours.  BMP:   Recent Labs  Lab 04/30/18 2135   *      K 3.4*      CO2 29   BUN 31*   CREATININE 1.8*   CALCIUM 8.4*   MG 1.8     CBC:   Recent Labs  Lab 04/30/18 2135   WBC 9.45   HGB 10.8*   HCT 34.4*        Lactic Acid:   Recent Labs  Lab 04/30/18 2135   LACTATE 1.2     Magnesium:   Recent Labs  Lab 04/30/18 2135   MG 1.8     Troponin:   Recent Labs  Lab 04/30/18 2135   TROPONINI 0.058*     TSH:   Recent Labs  Lab 04/30/18  2135   TSH 0.696     Urine Studies:   Recent Labs  Lab  04/30/18  2205   COLORU Other*   APPEARANCEUA Hazy*   PHUR 6.0   SPECGRAV 1.025   PROTEINUA 3+*   GLUCUA Negative   KETONESU Negative   BILIRUBINUA 1+*   OCCULTUA 3+*   NITRITE Negative   UROBILINOGEN 1.0   LEUKOCYTESUR Negative   RBCUA >100*   WBCUA 1   BACTERIA Rare   HYALINECASTS 0     All pertinent labs within the past 24 hours have been reviewed.    Significant Imaging: I have reviewed all pertinent imaging results/findings within the past 24 hours.   Imaging Results          CT Head Without Contrast (Final result)  Result time 04/30/18 22:47:27    Final result by Srinivas Gallardo Jr., MD (04/30/18 22:47:27)                 Impression:     Subtle zone of hypodensity in the right corona radiata, questionable subacute lacunar infarct.  Involutional changes.  Microvascular ischemic changes.  Old lateral lacunar infarcts.    All CT scans at this facility use dose modulation, iterative reconstruction, and/or weight based dosing when appropriate to reduce radiation dose to as low as reasonably achievable.        Electronically signed by: SRINIVAS GALLARDO MD  Date:     04/30/18  Time:    22:47              Narrative:    Exam: CT HEAD WITHOUT CONTRAST    History:  Neuro deficit(s), subacute       Technique: Noncontrast axial images of the head were obtained.    Comparison:04/02/2003    Findings: Bilateral symmetric involutional changes.  Bilateral microvascular ischemic change.      Small zone of hypodensity in the right corona radiata, measuring 1 cm, could correlate with a subacute ischemic lacunar infarct, images 17-18 series 2.  Does patient had a left-sided symptomatology to correlate?      Tiny old appearing lacunar infarcts elsewhere in the right corona radiata and basal ganglia and bilateral thalami.      Ventricular system is normal.  No hydrocephalus.  No midline shift.  No abnormal density to indicate acute major vascular distribution ischemic infarction or hemorrhage.  No mass effect.  No extra-axial fluid  collections.     Visualized paranasal sinuses and mastoid air cells appear clear.      No calvarial fracture.    Vascular calcifications.                             X-Ray Chest AP Portable (Final result)  Result time 04/30/18 21:12:52    Final result by Srinivas Gallardo Jr., MD (04/30/18 21:12:52)                 Impression:          As above.      Electronically signed by: SRINIVAS GALLARDO MD  Date:     04/30/18  Time:    21:12              Narrative:    EXAM:   XR CHEST AP PORTABLE    CLINICAL HISTORY:   Weakness    COMPARISON:  01/19/2016    FINDINGS:   Heart size is normal. Benign linear streak in the right lung base likely some platelike atelectasis.  Linear-appearing marking and/or pleural thickening in the right midlung zone.  Some mild pulmonary edema is not excluded.  The dense alveolar infiltrates can be appreciated.  No large effusions.                                Assessment/Plan:     * Cerebrovascular accident (CVA)    -  P/w LLE weakness  - CT brain showing subacute infarct in R corona radiata, questionable subacute lacunar infarct.  Old lateral lacunar infarcts.     - Admit to obs tele  - Order MRI brain, US carotid, and TTE  - Start ASA, statin, if cannot tolerate ASA, will start plavix  - Consult PT/OT to eval and treat  - Control BP Goal <140/80          Type 2 diabetes mellitus    - hold home meds  - check a1c  - place on SSI Prn, poc glucose qac/qhs        BPH (benign prostatic hyperplasia)    - stable  - UA gross hematuria    - check CT urogram  - continue finasteride, tamsulosin  - monitor I/O          Essential hypertension    - continue amlodipine, isordil, lisinopril, and metoprolol at home dose        CKD (chronic kidney disease), stage III    - at baseline  - monitor I/O  - replete electrolytes prn          SOB (shortness of breath)    - CXR atelectasis, no significant acute findings  - no si/sx of infection    - significant smoking hx, start duonebs q4h prn sob/wheezes, symbicort  q12h  - significant cardiac hx, check BNP, TTE        Crohn's colitis    - stable, no flare up  - continue mesalamine at home dose          CAD (coronary artery disease)    - continue ASA, statin            VTE Risk Mitigation         Ordered     heparin (porcine) injection 5,000 Units  Every 8 hours      05/01/18 0004     IP VTE HIGH RISK PATIENT  Once      05/01/18 0004             Zeke Mckoy MD  Department of Hospital Medicine   [facility]

## 2018-05-01 NOTE — ASSESSMENT & PLAN NOTE
- Patient reports h/o Sleep apnea and wears a CPAP at home  - CPAP q hs and while napping here  - Monitor

## 2018-05-02 LAB
ALBUMIN SERPL BCP-MCNC: 1.9 G/DL
ANION GAP SERPL CALC-SCNC: 9 MMOL/L
BASOPHILS # BLD AUTO: 0.08 K/UL
BASOPHILS NFR BLD: 1.1 %
BUN SERPL-MCNC: 27 MG/DL
CALCIUM SERPL-MCNC: 8.2 MG/DL
CHLORIDE SERPL-SCNC: 108 MMOL/L
CO2 SERPL-SCNC: 26 MMOL/L
CREAT SERPL-MCNC: 1.6 MG/DL
DIFFERENTIAL METHOD: ABNORMAL
EOSINOPHIL # BLD AUTO: 0.2 K/UL
EOSINOPHIL NFR BLD: 3.3 %
ERYTHROCYTE [DISTWIDTH] IN BLOOD BY AUTOMATED COUNT: 16.4 %
EST. GFR  (AFRICAN AMERICAN): 47 ML/MIN/1.73 M^2
EST. GFR  (NON AFRICAN AMERICAN): 41 ML/MIN/1.73 M^2
GLUCOSE SERPL-MCNC: 97 MG/DL
HCT VFR BLD AUTO: 29.3 %
HGB BLD-MCNC: 9.2 G/DL
LYMPHOCYTES # BLD AUTO: 1.4 K/UL
LYMPHOCYTES NFR BLD: 19.8 %
MAGNESIUM SERPL-MCNC: 1.6 MG/DL
MCH RBC QN AUTO: 25.4 PG
MCHC RBC AUTO-ENTMCNC: 31.4 G/DL
MCV RBC AUTO: 81 FL
MONOCYTES # BLD AUTO: 0.9 K/UL
MONOCYTES NFR BLD: 12.1 %
NEUTROPHILS # BLD AUTO: 4.5 K/UL
NEUTROPHILS NFR BLD: 63.7 %
PHOSPHATE SERPL-MCNC: 3.1 MG/DL
PHOSPHATE SERPL-MCNC: 3.1 MG/DL
PLATELET # BLD AUTO: 184 K/UL
PMV BLD AUTO: 10.6 FL
POCT GLUCOSE: 100 MG/DL (ref 70–110)
POCT GLUCOSE: 101 MG/DL (ref 70–110)
POCT GLUCOSE: 107 MG/DL (ref 70–110)
POCT GLUCOSE: 98 MG/DL (ref 70–110)
POTASSIUM SERPL-SCNC: 3.5 MMOL/L
RBC # BLD AUTO: 3.62 M/UL
SODIUM SERPL-SCNC: 143 MMOL/L
WBC # BLD AUTO: 7.03 K/UL

## 2018-05-02 PROCEDURE — 27000221 HC OXYGEN, UP TO 24 HOURS

## 2018-05-02 PROCEDURE — 94640 AIRWAY INHALATION TREATMENT: CPT

## 2018-05-02 PROCEDURE — 92610 EVALUATE SWALLOWING FUNCTION: CPT

## 2018-05-02 PROCEDURE — 80069 RENAL FUNCTION PANEL: CPT

## 2018-05-02 PROCEDURE — 25000003 PHARM REV CODE 250: Performed by: HOSPITALIST

## 2018-05-02 PROCEDURE — 83735 ASSAY OF MAGNESIUM: CPT

## 2018-05-02 PROCEDURE — 85025 COMPLETE CBC W/AUTO DIFF WBC: CPT

## 2018-05-02 PROCEDURE — 25000242 PHARM REV CODE 250 ALT 637 W/ HCPCS: Performed by: HOSPITALIST

## 2018-05-02 PROCEDURE — 63600175 PHARM REV CODE 636 W HCPCS: Performed by: HOSPITALIST

## 2018-05-02 PROCEDURE — 21400001 HC TELEMETRY ROOM

## 2018-05-02 PROCEDURE — 92523 SPEECH SOUND LANG COMPREHEN: CPT

## 2018-05-02 PROCEDURE — 94761 N-INVAS EAR/PLS OXIMETRY MLT: CPT

## 2018-05-02 PROCEDURE — 97530 THERAPEUTIC ACTIVITIES: CPT

## 2018-05-02 PROCEDURE — 97110 THERAPEUTIC EXERCISES: CPT

## 2018-05-02 PROCEDURE — 25000003 PHARM REV CODE 250: Performed by: NURSE PRACTITIONER

## 2018-05-02 PROCEDURE — G8978 MOBILITY CURRENT STATUS: HCPCS | Mod: CL

## 2018-05-02 PROCEDURE — 97112 NEUROMUSCULAR REEDUCATION: CPT

## 2018-05-02 PROCEDURE — 36415 COLL VENOUS BLD VENIPUNCTURE: CPT

## 2018-05-02 PROCEDURE — G8979 MOBILITY GOAL STATUS: HCPCS | Mod: CJ

## 2018-05-02 RX ADMIN — BUDESONIDE 0.25 MG: 0.25 SUSPENSION RESPIRATORY (INHALATION) at 08:05

## 2018-05-02 RX ADMIN — ASPIRIN 81 MG: 81 TABLET, COATED ORAL at 08:05

## 2018-05-02 RX ADMIN — ISOSORBIDE DINITRATE 20 MG: 10 TABLET ORAL at 08:05

## 2018-05-02 RX ADMIN — ISOSORBIDE DINITRATE 20 MG: 10 TABLET ORAL at 09:05

## 2018-05-02 RX ADMIN — LISINOPRIL 40 MG: 20 TABLET ORAL at 08:05

## 2018-05-02 RX ADMIN — ARFORMOTEROL TARTRATE 15 MCG: 15 SOLUTION RESPIRATORY (INHALATION) at 07:05

## 2018-05-02 RX ADMIN — METOPROLOL SUCCINATE 12.5 MG: 25 TABLET, EXTENDED RELEASE ORAL at 08:05

## 2018-05-02 RX ADMIN — TAMSULOSIN HYDROCHLORIDE 0.4 MG: 0.4 CAPSULE ORAL at 08:05

## 2018-05-02 RX ADMIN — AMLODIPINE BESYLATE 5 MG: 5 TABLET ORAL at 08:05

## 2018-05-02 RX ADMIN — BUDESONIDE 0.25 MG: 0.25 SUSPENSION RESPIRATORY (INHALATION) at 07:05

## 2018-05-02 RX ADMIN — ISOSORBIDE DINITRATE 20 MG: 10 TABLET ORAL at 02:05

## 2018-05-02 RX ADMIN — HEPARIN SODIUM 5000 UNITS: 5000 INJECTION, SOLUTION INTRAVENOUS; SUBCUTANEOUS at 02:05

## 2018-05-02 RX ADMIN — HEPARIN SODIUM 5000 UNITS: 5000 INJECTION, SOLUTION INTRAVENOUS; SUBCUTANEOUS at 09:05

## 2018-05-02 RX ADMIN — HEPARIN SODIUM 5000 UNITS: 5000 INJECTION, SOLUTION INTRAVENOUS; SUBCUTANEOUS at 06:05

## 2018-05-02 RX ADMIN — CLOPIDOGREL BISULFATE 75 MG: 75 TABLET ORAL at 08:05

## 2018-05-02 RX ADMIN — FINASTERIDE 5 MG: 5 TABLET, FILM COATED ORAL at 08:05

## 2018-05-02 RX ADMIN — ARFORMOTEROL TARTRATE 15 MCG: 15 SOLUTION RESPIRATORY (INHALATION) at 08:05

## 2018-05-02 RX ADMIN — ATORVASTATIN CALCIUM 40 MG: 40 TABLET, FILM COATED ORAL at 08:05

## 2018-05-02 NOTE — PLAN OF CARE
05/02/18 1205   Medicare Message   Important Message from Medicare regarding Discharge Appeal Rights Given to patient/caregiver;Explained to patient/caregiver;Signed/date by patient/caregiver   Date IMM was signed 05/02/18   Time IMM was signed 1140

## 2018-05-02 NOTE — PLAN OF CARE
Spoke with Yissel at [facility] who came here to evaluate patient prior to lunch.  Yissel stating that she is awaiting ST Note and another PT/OT Note and that they will be able to accept patient tomorrow morning.

## 2018-05-02 NOTE — PT/OT/SLP EVAL
Speech Language Pathology Evaluation    Patient Name:  Ryland Ortez Jr.   MRN:  [id number]  Admitting Diagnosis: Cerebrovascular accident (CVA)    Recommendations:                 General Recommendations:  Speech/language therapy  Diet recommendations:  Regular, Thin   Aspiration Precautions: HOB to 90 degrees and Remain upright 30 minutes post meal   General Precautions: Standard, fall  Communication strategies:  none    History:     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    Overactive bladder     Peripheral vascular disease 1/20/2016    Prediabetes     PVD (peripheral vascular disease) 7/8/2013    Stroke        Past Surgical History:   Procedure Laterality Date    ARTERIAL ANEURYSM REPAIR      AAA repair    CAROTID ARTERY ANGIOPLASTY      COLONOSCOPY N/A 10/9/2015    Procedure: COLONOSCOPY;  Surgeon: Dedrick Brownlee MD;  Location: [facility] ENDO;  Service: Endoscopy;  Laterality: N/A;    COLONOSCOPY N/A 11/24/2015    Procedure: COLONOSCOPY;  Surgeon: Dedrick Brownlee MD;  Location: [facility] ENDO;  Service: Endoscopy;  Laterality: N/A;    CORONARY ANGIOPLASTY WITH STENT PLACEMENT      2010?    ROTATOR CUFF REPAIR      Right       Social History: Patient lives with his wife.  He reports walking at home but mostly sitting down watching TV.  It was noted that pt had wounds/sores when admitted.  He states that he is still responsible for his finances and pays bills.       Subjective   Pt was seen at bedside with no family present.    Patient goals: none stated     Pain/Comfort:  · Pain Rating 1:  0/10    Objective:     Oral Musculature Evaluation  · Oral Musculature: WFL  · Dentition: present and adequate    Bedside Swallow Eval:   Consistencies Assessed:  · Thin liquids and solids were assessed at bedside      Oral Phase:   · Pt impulsive taking multiple, large sips at a time    Pharyngeal Phase:   · no overt clinical signs/symptoms of aspiration  · no overt clinical signs/symptoms of pharyngeal dysphagia    Expressive Language:  · WFL - Pt is able to express his wants and needs without word finding deficits    Receptive Language:  · WFL - Pt is able to answer simple questions, follow multi-step directions, and participate in conversation    Cognition:  · Decreased higher-level problem solving, memory, organization, safety awareness, and insight.   Pt required cueing for recall of item after 2 minutes as well as family tree.  Pt presents with little motivation.       Assessment:     Ryland Ortez Jr. is a 78 y.o. male with an SLP diagnosis of Cognitive-Linguistic Impairment.  He presents with decreased motivation and insight.  Pt will benefit from skilled ST for stated deficits.     Goals:    SLP Goals        Problem: SLP Goal    Goal Priority Disciplines Outcome   SLP Goal     SLP    Description:  1. Pt will complete higher level cognitive tasks with 80% acc to increase problem solving, sequencing, safety awareness, and organization                    Plan:     · Patient to be seen:  3 x/week   · Plan of Care expires:  05/09/18  · Plan of Care reviewed with:  patient   · SLP Follow-Up:  Yes       Discharge recommendations:  rehabilitation facility   Barriers to Discharge:  None    Time Tracking:     SLP Treatment Date:   05/02/18  Speech Start Time:  1150  Speech Stop Time:  1215     Speech Total Time (min):  25 min    Billable Minutes: Eval 15  and Eval Swallow and Oral Function 10    Celi Davis CCC-SLP  05/02/2018

## 2018-05-02 NOTE — PLAN OF CARE
Problem: Patient Care Overview  Goal: Plan of Care Review  Outcome: Ongoing (interventions implemented as appropriate)  IMPROVED FUNCTIONAL MOBILITY TODAY, PT WITH LOW ACTIVITY TOLERANCE MAY BE PLOF, MAXA X 2 FOR BED MOBILITY AND SIT<>STAND TF, MODA FOR SIDE STEPPING, WEAKNESS TO LLE

## 2018-05-02 NOTE — PT/OT/SLP PROGRESS
Physical Therapy  Treatment    Ryland Ortez Jr.   MRN: [id number]   Admitting Diagnosis: Cerebrovascular accident (CVA)    PT Received On: 05/02/18  PT Start Time: 0930     PT Stop Time: 0955    PT Total Time (min): 25 min       Billable Minutes:  Therapeutic Activity 15 and Neuromuscular Re-education 10    Treatment Type: Treatment  PT/PTA: PT       General Precautions: Standard, fall  Orthopedic Precautions: N/A   Braces: N/A    Subjective:  Communicated with NURSE MATIAS prior to session.  PT AGREEABLE TO P.T. TX. THIS AM, FLAT AFFECT MOST OF TX.  Pain/Comfort  Pain Rating 1: 0/10    Objective:   Patient found with: telemetry, oxygen  PT FOUND SUPINE IN AIR SPECIALTY BED    Functional Mobility:  Bed Mobility:   MODA FOR SUPINE ROLLING IN BED, MAXA X 2 FOR SUP<>SIT TF, MODA FOR SEATED SEATED    Transfers:  MAXA X 2 FOR SIT<>STAND TF 1ST TRIAL, MODA X 2 FOR SIT<>STAND TF 2ND TRIAL, QUICK TO FATIGUE    Gait:   PT PERFORMED SIDE STEPPING IN FROM OF BED WITH RW AND MODA, QUICK TO FATIGUE, REQUIRED ASSIST FOR WT. SHIFTING, MINIMAL BUCKLING TO LLE, CUES FOR UPRIGHT POSTURE AND RW SAFETY, 2 SMALL SEATED REST BREAK    Balance:   Static Sit: FAIR  Dynamic Sit: POOR+  Static Stand: POOR  Dynamic stand:  POOR    Therapeutic Activities and Exercises:  PT WITH SOILED BRIEF AND GOWN, MAXA FOR CHANGING BRIEF AS WELL AS GOWN.  REVIEWED BLE THEREX FOR PT TO PERFORM WHILE SUPINE IN BED    AM-PAC 6 CLICK MOBILITY  How much help from another person does this patient currently need?   1 = Unable, Total/Dependent Assistance  2 = A lot, Maximum/Moderate Assistance  3 = A little, Minimum/Contact Guard/Supervision  4 = None, Modified [place]/Independent    Turning over in bed (including adjusting bedclothes, sheets and blankets)?: 2  Sitting down on and standing up from a chair with arms (e.g., wheelchair, bedside commode, etc.): 2  Moving from lying on back to sitting on the side of the bed?: 2  Moving to and from a bed to a chair  (including a wheelchair)?: 2  Need to walk in hospital room?: 2  Climbing 3-5 steps with a railing?: 1  Total Score: 11    AM-PAC Raw Score CMS G-Code Modifier Level of Impairment Assistance   6 % Total / Unable   7 - 9 CM 80 - 100% Maximal Assist   10 - 14 CL 60 - 80% Moderate Assist   15 - 19 CK 40 - 60% Moderate Assist   20 - 22 CJ 20 - 40% Minimal Assist   23 CI 1-20% SBA / CGA   24 CH 0% Independent/ Mod I     Patient left SEATED AT EOB with all lines intact, call button in reach, NURSE notified and AIDE present.  AIDE ASSISTING PT WITH BATH    Assessment:  Ryland Ortez Jr. is a 78 y.o. male with a medical diagnosis of Cerebrovascular accident (CVA) and presents with IMPAIRED FUNCTIONAL MOBILITY. PT WILL BENEFIT FROM CONT. SKILLED P.T. TO ADDRESS IMPAIRMENTS    Rehab identified problem list/impairments: Rehab identified problem list/impairments: weakness, impaired endurance, impaired functional mobilty, gait instability, impaired balance, decreased coordination, decreased safety awareness    Rehab potential is good.    Activity tolerance: Good    Discharge recommendations: Discharge Facility/Level Of Care Needs: rehabilitation facility     Barriers to discharge: NONE    Equipment recommendations: Equipment Needed After Discharge: none     GOALS:    Physical Therapy Goals        Problem: Physical Therapy Goal    Goal Priority Disciplines Outcome Goal Variances Interventions   Physical Therapy Goal     PT/OT, PT      Description:  LTG'S TO BE MET IN 7 DAYS (5-8-18)  1. PT WILL REQUIRE [place] FOR BED MOBILITY  2. PT WILL REQUIRE MODA FOR TF'S  3. PT WILL AMB 50' WITH RW AND MODA  4. PT WILL TOLERATE BLE THEREX X 20 REPS AROM  5. PT WILL DEMO P+ DYNAMIC BALANCE DURING GAIT                  PLAN:    Patient to be seen 5 x/week  to address the above listed problems via gait training, therapeutic activities, therapeutic exercises, neuromuscular re-education  Plan of Care expires: 05/08/18  Plan of Care  reviewed with: patient     PT ENCOURAGED TO CALL FOR ASSISTANCE WITH ALL NEEDS DUE TO FALL RISK STATUS, PT AGREEABLE    PT G-Codes  Functional Assessment Tool Used: [facility]  Score: 11  Functional Limitation: Mobility: Walking and moving around  Mobility: Walking and Moving Around Current Status (): CL  Mobility: Walking and Moving Around Goal Status (): LISA Whittaker, PT  05/02/2018

## 2018-05-02 NOTE — SUBJECTIVE & OBJECTIVE
Interval History: No acute issues overnight. Neuro following. The patient will need rehab placement at discharge. Case management is aware.       Review of Systems   Constitutional: Positive for fatigue. Negative for chills, diaphoresis and fever.   HENT: Negative for hearing loss, mouth sores, sore throat, tinnitus and trouble swallowing.    Eyes: Negative for pain, discharge and redness.   Respiratory: Negative for apnea, cough, choking, chest tightness, shortness of breath, wheezing and stridor.    Cardiovascular: Negative for chest pain, palpitations and leg swelling.   Gastrointestinal: Negative for abdominal distention, abdominal pain, blood in stool, constipation, diarrhea, nausea, rectal pain and vomiting.   Endocrine: Negative for cold intolerance, heat intolerance, polydipsia, polyphagia and polyuria.   Genitourinary: Negative for difficulty urinating, dysuria, flank pain, frequency, hematuria and urgency.   Musculoskeletal: Negative for arthralgias, back pain, gait problem, joint swelling, neck pain and neck stiffness.   Skin: Negative for color change, rash and wound.   Allergic/Immunologic: Negative for food allergies.   Neurological: Positive for weakness. Negative for dizziness, tremors, seizures, syncope, speech difficulty, light-headedness and headaches.        LUE and LLE weakness   Hematological: Negative for adenopathy. Does not bruise/bleed easily.   Psychiatric/Behavioral: Negative for agitation and confusion. The patient is not nervous/anxious.    All other systems reviewed and are negative.    Objective:     Vital Signs (Most Recent):  Temp: 98.3 °F (36.8 °C) (05/02/18 1228)  Pulse: 72 (05/02/18 1300)  Resp: 18 (05/02/18 1228)  BP: 128/76 (05/02/18 1228)  SpO2: 98 % (05/02/18 1228) Vital Signs (24h Range):  Temp:  [97.7 °F (36.5 °C)-98.4 °F (36.9 °C)] 98.3 °F (36.8 °C)  Pulse:  [55-85] 72  Resp:  [18-20] 18  SpO2:  [95 %-100 %] 98 %  BP: (128-165)/(76-92) 128/76     Weight: 69.5 kg (153 lb  3.5 oz)  Body mass index is 22.63 kg/m².  No intake or output data in the 24 hours ending 05/02/18 1531   Physical Exam   Constitutional: He is oriented to person, place, and time. He appears well-developed and well-nourished. No distress.   HENT:   Head: Normocephalic and atraumatic.   Mouth/Throat: Oropharynx is clear and moist.   Eyes: Conjunctivae are normal. Pupils are equal, round, and reactive to light. No scleral icterus.   Neck: Normal range of motion. Neck supple. No JVD present. No thyromegaly present.   Cardiovascular: Normal rate, regular rhythm, normal heart sounds and intact distal pulses.  Exam reveals no gallop and no friction rub.    No murmur heard.  Pulmonary/Chest: Effort normal and breath sounds normal. No respiratory distress. He has no wheezes. He has no rales.   Abdominal: Soft. Bowel sounds are normal. He exhibits no distension. There is no tenderness. There is no guarding.   Musculoskeletal: He exhibits no edema, tenderness or deformity.   Strength 4/5 to LUE. LLE 0/5.  RUE and RLE 4/5.    Neurological: He is alert and oriented to person, place, and time. No cranial nerve deficit or sensory deficit. GCS eye subscore is 4. GCS verbal subscore is 5. GCS motor subscore is 6.   Follows all commands appropriately.  Speech clear.     Skin: Skin is warm and dry. Capillary refill takes 2 to 3 seconds. No rash noted. He is not diaphoretic. No erythema.   Psychiatric: He has a normal mood and affect. His behavior is normal.   Nursing note and vitals reviewed.      Significant Labs: All pertinent labs within the past 24 hours have been reviewed.    Significant Imaging:   Imaging Results          MRI Brain (Stroke Protocol) Without Contrast (Final result)  Result time 05/01/18 14:05:43    Final result by Mal Saba MD (05/01/18 14:05:43)                 Impression:      Extensive small vessel disease.  Multiple old-appearing deep white matter lacunar infarcts.  Bilateral hippocampal cysts.  No  definite acute intracranial abnormality.      Electronically signed by: Mal Saba MD  Date:    05/01/2018  Time:    14:05             Narrative:    EXAMINATION:  MRI BRAIN (STROKE PROTOCOL) WITHOUT CONTRAST    CLINICAL HISTORY:  Stroke;.    TECHNIQUE:  Multiplanar multisequence MR imaging of the brain was performed without contrast.    COMPARISON:  None    FINDINGS:  Extensive increased signal in the deep white matter and subcortical locations of both cerebral hemispheres consistent with small vessel disease.  Multiple small old-appearing lacunar infarcts.  No diffusion-weighted sequence abnormality identified to suspect acute ischemic changes.    Mild diffuse cerebral atrophy with mild enlargement of the lateral ventricles and enlargement of cortical sulci.  No evidence of intra or extra-axial hemorrhage.    Normal vascular flow voids are preserved.    Sinuses are clear.    Small amount of fluid in the mastoid air cells can be seen greater on the left.                               US Carotid Bilateral (Final result)  Result time 05/01/18 10:04:33    Final result by CESAR Aparicio Sr., MD (05/01/18 10:04:33)                 Impression:      1. No clinically significant area of stenosis.  2. There is a mild amount of atherosclerosis bilaterally.  Validated velocity measurements with angiographic measurements, velocity criteria are extrapolated from diameter data as defined by the Society of Radiologists in Ultrasound Consensus Conference      Electronically signed by: Ward pAaricio MD  Date:    05/01/2018  Time:    10:04             Narrative:    EXAMINATION:  US CAROTID BILATERAL    CLINICAL HISTORY:  cva;    TECHNIQUE:  Multiple static ultrasound images are submitted for interpretation with color flow and spectral Doppler imaging.    COMPARISON:  None    FINDINGS:  There is a moderate amount of atherosclerosis bilaterally.    The following pertains to the right side of the neck. The common carotid artery has  a normal arterial waveform with peak systolic velocity of 59 cm/sec and a diastolic velocity of 10 cm/sec. The internal carotid artery has a normal arterial waveform with a peak systolic velocity of 73 cm/sec and a diastolic velocity of 5 cm/sec. The external carotid artery has a peak systolic velocity of 129 cm/sec. The vertebral artery has normal antegrade flow.    The following pertains to the left side of the neck. The common carotid artery has a normal arterial waveform with peak systolic velocity of 50 cm/sec and a diastolic velocity of 5 cm/sec. The internal carotid artery has a normal arterial waveform with a peak systolic velocity of 64 cm/sec and a diastolic velocity of 14 cm/sec. The external carotid artery has a peak systolic velocity of 120 cm/sec. The vertebral artery has normal antegrade flow.                               CT Head Without Contrast (Final result)  Result time 04/30/18 22:47:27    Final result by Srinivas Gallardo Jr., MD (04/30/18 22:47:27)                 Impression:     Subtle zone of hypodensity in the right corona radiata, questionable subacute lacunar infarct.  Involutional changes.  Microvascular ischemic changes.  Old lateral lacunar infarcts.    All CT scans at this facility use dose modulation, iterative reconstruction, and/or weight based dosing when appropriate to reduce radiation dose to as low as reasonably achievable.        Electronically signed by: SRINIVAS GALLARDO MD  Date:     04/30/18  Time:    22:47              Narrative:    Exam: CT HEAD WITHOUT CONTRAST    History:  Neuro deficit(s), subacute       Technique: Noncontrast axial images of the head were obtained.    Comparison:04/02/2003    Findings: Bilateral symmetric involutional changes.  Bilateral microvascular ischemic change.      Small zone of hypodensity in the right corona radiata, measuring 1 cm, could correlate with a subacute ischemic lacunar infarct, images 17-18 series 2.  Does patient had a left-sided  symptomatology to correlate?      Tiny old appearing lacunar infarcts elsewhere in the right corona radiata and basal ganglia and bilateral thalami.      Ventricular system is normal.  No hydrocephalus.  No midline shift.  No abnormal density to indicate acute major vascular distribution ischemic infarction or hemorrhage.  No mass effect.  No extra-axial fluid collections.     Visualized paranasal sinuses and mastoid air cells appear clear.      No calvarial fracture.    Vascular calcifications.                             X-Ray Chest AP Portable (Final result)  Result time 04/30/18 21:12:52    Final result by Srinivas Gallardo Jr., MD (04/30/18 21:12:52)                 Impression:          As above.      Electronically signed by: SRINIVAS GALLARDO MD  Date:     04/30/18  Time:    21:12              Narrative:    EXAM:   XR CHEST AP PORTABLE    CLINICAL HISTORY:   Weakness    COMPARISON:  01/19/2016    FINDINGS:   Heart size is normal. Benign linear streak in the right lung base likely some platelike atelectasis.  Linear-appearing marking and/or pleural thickening in the right midlung zone.  Some mild pulmonary edema is not excluded.  The dense alveolar infiltrates can be appreciated.  No large effusions.

## 2018-05-02 NOTE — PLAN OF CARE
Problem: Patient Care Overview  Goal: Plan of Care Review  Outcome: Ongoing (interventions implemented as appropriate)  Pt AAOx4, PIV intact and WNL, No pain noted. No falls reported. Pt turn q 2 hr, wedge in place, sizewise bed in use, heels/legs floated on pillows. All patient care orders carried out per MD order. Pt educated and oriented to room, equipment and hospital policies. Fall teaching performed. Call light within reach, bed locked and in low position. Tele monitor in use.

## 2018-05-02 NOTE — PLAN OF CARE
Problem: Patient Care Overview  Goal: Plan of Care Review  Outcome: Ongoing (interventions implemented as appropriate)  Patient AAO3 name date and situation  Vital signs stable   Blood glucose monitoring  Tolerated all scheduled care   Patient rounds assessed; Free of falls on current shift   No c/o pain or discomfort   Will continue to monitor for any signs or symptoms of vital decline

## 2018-05-02 NOTE — PT/OT/SLP PROGRESS
Occupational Therapy  Treatment    Ryland Ortez Jr.   MRN: [id number]   Admitting Diagnosis: Cerebrovascular accident (CVA)    OT Date of Treatment: 05/02/18   OT Start Time: 1255  OT Stop Time: 1325  OT Total Time (min): 30 min    Billable Minutes:  Therapeutic Activity 15 minutes and Therapeutic Exercise 15 minutes    General Precautions: Standard, fall  Orthopedic Precautions: N/A  Braces: N/A         Subjective:  Communicated with nurse Fan prior to session.    Pain/Comfort  Pain Rating 1: 0/10    Objective:        Functional Mobility:  Bed Mobility:       Transfers:        Functional Ambulation: na    Activities of Daily Living:     Feeding adaptive equipment: s with set up in bed in chair position     UE adaptive equipment: na     LE adaptive equipment: na                    Bathing adaptive equipment: na    Balance:   Static Sit: GOOD: Takes MODERATE challenges from all directions  Dynamic Sit: FAIR+: Maintains balance through MINIMAL excursions of active trunk motion  Static Stand: poor-  Dynamic stand: poor-    Therapeutic Activities and Exercises:  Pt seen in room . Pt req mod a with bed mobility and supine>sit t/f's with leg lift. Pt req mod/ mi a with forward scooting eob.  Pt educated on chair push ups and returned demonstration (s). Pt educated to perform exercise while someone in room. Pt performed 1 set x 10 reps b ue rom exercise while seated eob with min resistance.    AM-PAC 6 CLICK ADL   How much help from another person does this patient currently need?   1 = Unable, Total/Dependent Assistance  2 = A lot, Maximum/Moderate Assistance  3 = A little, Minimum/Contact Guard/Supervision  4 = None, Modified [place]/Independent    Putting on and taking off regular lower body clothing? : 2  Bathing (including washing, rinsing, drying)?: 2  Toileting, which includes using toilet, bedpan, or urinal? : 2  Putting on and taking off regular upper body clothing?: 2  Taking care of personal grooming such  "as brushing teeth?: 2  Eating meals?: 2  Total Score: 12     AM-PAC Raw Score CMS "G-Code Modifier Level of Impairment Assistance   6 % Total / Unable   7 - 8 CM 80 - 100% Maximal Assist   9-13 CL 60 - 80% Moderate Assist   14 - 19 CK 40 - 60% Moderate Assist   20 - 22 CJ 20 - 40% Minimal Assist   23 CI 1-20% SBA / CGA   24 CH 0% Independent/ Mod I       Patient left up in chair with all lines intact, call button in reach and nurse mariella notified    ASSESSMENT:  Ryland Ortez Jr. is a 78 y.o. male with a medical diagnosis of Cerebrovascular accident (CVA) and presents with l side weakness, impaired adl's, decrease functional mobility and t/f's.    Rehab identified problem list/impairments: Rehab identified problem list/impairments: weakness, impaired functional mobilty, impaired cognition, decreased safety awareness, decreased coordination, gait instability, impaired endurance, impaired self care skills    Rehab potential is good.    Activity tolerance: Good    Discharge recommendations: Discharge Facility/Level Of Care Needs: rehabilitation facility     Barriers to discharge: Barriers to Discharge: None    Equipment recommendations: none     GOALS:    Occupational Therapy Goals        Problem: Occupational Therapy Goal    Goal Priority Disciplines Outcome Interventions   Occupational Therapy Goal     OT, PT/OT     Description:  Goals to be met by: 5-1-18      Patient will increase functional independence with ADLs by performing:    UE Dressing with Minimal Assistance.  LE Dressing with Minimal Assistance.  Toilet transfer to bedside commode with Minimal Assistance.  Upper extremity exercise program x15  reps per handout, with min resistance             Problem: Occupational Therapy Goal    Goal Priority Disciplines Outcome Interventions   Occupational Therapy Goal     OT, PT/OT                     Plan:  Patient to be seen 3 x/week to address the above listed problems via therapeutic activities, " therapeutic exercises, self-care/home management  Plan of Care expires: 05/08/18  Plan of Care reviewed with: patient         Eli Erwin, OT  05/02/2018

## 2018-05-02 NOTE — PROGRESS NOTES
"[facility] Hospital Medicine  Progress Note    Patient Name: Ryland Ortez Jr.  MRN: [id number]  Patient Class: IP- Inpatient   Admission Date: 4/30/2018  Length of Stay: 1 days  Attending Physician: Héctor Loco MD  Primary Care Provider: Janina Emerson MD        Subjective:     Principal Problem:Cerebrovascular accident (CVA)    HPI:  78M h/o CVA, COPD, HTN, PAD, CAD, Crohn's disease, and CKD stage III presents with L sided weakness x 1 week.  Reports "stiffiness".  Associated with mechanical falls x 3 and imbalance.  Prior to 1 week ago, patient was able to ambulate with walker without instability.   Also c/o SOB with exertion.  Patient is a former smoker.  He quit in 2008.  Was a 40 pack year smoker.   [facility]  Chart review does not provide further significant history pertaining to HPI.  In ER, CT brain show subacute infarct of right corona radiata, with questionable subacute lacunar infarct, old lacunar infarcts are seen.  Hospital medicine called for admission.          Hospital Course:  On 4/30 patient was admitted to OBS secondary to Subacute CVA.  CT head showed Subtle zone of hypodensity in the right corona radiata, questionable subacute lacunar infarct.  Involutional changes.  Microvascular ischemic changes.  Old lateral lacunar infarcts.  No evidence of hemorrhage.  Carotid US showed No clinically significant area of stenosis.  There is a mild amount of atherosclerosis bilaterally.  MRI, ECHO, and Lipid panel pending.  Neuro consult pending.  PT/OT/ST evals pending.  Per d/w PT today, patient will need inpatient rehab.  SW consulted to assist with placement.  Initial UA showed >100 RBCs.  Patient denies hematuria and all bladder complaints.  Renal CT and repeat UA pending.  Patient reports taking ASA 81 mg daily, Plavix 75 mg daily and statin therapy, which have been resumed.  Periods of apnea noted while sleeping; patient reports he uses a CPAP " at night for h/o sleep apnea, will order for use here q hs and while napping.  5/2/18 No acute issues overnight. Neuro following. The patient will need rehab placement at discharge. Case management is aware.     Interval History: No acute issues overnight. Neuro following. The patient will need rehab placement at discharge. Case management is aware.       Review of Systems   Constitutional: Positive for fatigue. Negative for chills, diaphoresis and fever.   HENT: Negative for hearing loss, mouth sores, sore throat, tinnitus and trouble swallowing.    Eyes: Negative for pain, discharge and redness.   Respiratory: Negative for apnea, cough, choking, chest tightness, shortness of breath, wheezing and stridor.    Cardiovascular: Negative for chest pain, palpitations and leg swelling.   Gastrointestinal: Negative for abdominal distention, abdominal pain, blood in stool, constipation, diarrhea, nausea, rectal pain and vomiting.   Endocrine: Negative for cold intolerance, heat intolerance, polydipsia, polyphagia and polyuria.   Genitourinary: Negative for difficulty urinating, dysuria, flank pain, frequency, hematuria and urgency.   Musculoskeletal: Negative for arthralgias, back pain, gait problem, joint swelling, neck pain and neck stiffness.   Skin: Negative for color change, rash and wound.   Allergic/Immunologic: Negative for food allergies.   Neurological: Positive for weakness. Negative for dizziness, tremors, seizures, syncope, speech difficulty, light-headedness and headaches.        LUE and LLE weakness   Hematological: Negative for adenopathy. Does not bruise/bleed easily.   Psychiatric/Behavioral: Negative for agitation and confusion. The patient is not nervous/anxious.    All other systems reviewed and are negative.    Objective:     Vital Signs (Most Recent):  Temp: 98.3 °F (36.8 °C) (05/02/18 1228)  Pulse: 72 (05/02/18 1300)  Resp: 18 (05/02/18 1228)  BP: 128/76 (05/02/18 1228)  SpO2: 98 % (05/02/18 1228)  Vital Signs (24h Range):  Temp:  [97.7 °F (36.5 °C)-98.4 °F (36.9 °C)] 98.3 °F (36.8 °C)  Pulse:  [55-85] 72  Resp:  [18-20] 18  SpO2:  [95 %-100 %] 98 %  BP: (128-165)/(76-92) 128/76     Weight: 69.5 kg (153 lb 3.5 oz)  Body mass index is 22.63 kg/m².  No intake or output data in the 24 hours ending 05/02/18 1531   Physical Exam   Constitutional: He is oriented to person, place, and time. He appears well-developed and well-nourished. No distress.   HENT:   Head: Normocephalic and atraumatic.   Mouth/Throat: Oropharynx is clear and moist.   Eyes: Conjunctivae are normal. Pupils are equal, round, and reactive to light. No scleral icterus.   Neck: Normal range of motion. Neck supple. No JVD present. No thyromegaly present.   Cardiovascular: Normal rate, regular rhythm, normal heart sounds and intact distal pulses.  Exam reveals no gallop and no friction rub.    No murmur heard.  Pulmonary/Chest: Effort normal and breath sounds normal. No respiratory distress. He has no wheezes. He has no rales.   Abdominal: Soft. Bowel sounds are normal. He exhibits no distension. There is no tenderness. There is no guarding.   Musculoskeletal: He exhibits no edema, tenderness or deformity.   Strength 4/5 to LUE. LLE 0/5.  RUE and RLE 4/5.    Neurological: He is alert and oriented to person, place, and time. No cranial nerve deficit or sensory deficit. GCS eye subscore is 4. GCS verbal subscore is 5. GCS motor subscore is 6.   Follows all commands appropriately.  Speech clear.     Skin: Skin is warm and dry. Capillary refill takes 2 to 3 seconds. No rash noted. He is not diaphoretic. No erythema.   Psychiatric: He has a normal mood and affect. His behavior is normal.   Nursing note and vitals reviewed.      Significant Labs: All pertinent labs within the past 24 hours have been reviewed.    Significant Imaging:   Imaging Results          MRI Brain (Stroke Protocol) Without Contrast (Final result)  Result time 05/01/18 14:05:43     Final result by Mal Saba MD (05/01/18 14:05:43)                 Impression:      Extensive small vessel disease.  Multiple old-appearing deep white matter lacunar infarcts.  Bilateral hippocampal cysts.  No definite acute intracranial abnormality.      Electronically signed by: Mal Saba MD  Date:    05/01/2018  Time:    14:05             Narrative:    EXAMINATION:  MRI BRAIN (STROKE PROTOCOL) WITHOUT CONTRAST    CLINICAL HISTORY:  Stroke;.    TECHNIQUE:  Multiplanar multisequence MR imaging of the brain was performed without contrast.    COMPARISON:  None    FINDINGS:  Extensive increased signal in the deep white matter and subcortical locations of both cerebral hemispheres consistent with small vessel disease.  Multiple small old-appearing lacunar infarcts.  No diffusion-weighted sequence abnormality identified to suspect acute ischemic changes.    Mild diffuse cerebral atrophy with mild enlargement of the lateral ventricles and enlargement of cortical sulci.  No evidence of intra or extra-axial hemorrhage.    Normal vascular flow voids are preserved.    Sinuses are clear.    Small amount of fluid in the mastoid air cells can be seen greater on the left.                               US Carotid Bilateral (Final result)  Result time 05/01/18 10:04:33    Final result by CESAR Aparicio Sr., MD (05/01/18 10:04:33)                 Impression:      1. No clinically significant area of stenosis.  2. There is a mild amount of atherosclerosis bilaterally.  Validated velocity measurements with angiographic measurements, velocity criteria are extrapolated from diameter data as defined by the Society of Radiologists in Ultrasound Consensus Conference      Electronically signed by: Ward Aparicio MD  Date:    05/01/2018  Time:    10:04             Narrative:    EXAMINATION:  US CAROTID BILATERAL    CLINICAL HISTORY:  cva;    TECHNIQUE:  Multiple static ultrasound images are submitted for interpretation with color  flow and spectral Doppler imaging.    COMPARISON:  None    FINDINGS:  There is a moderate amount of atherosclerosis bilaterally.    The following pertains to the right side of the neck. The common carotid artery has a normal arterial waveform with peak systolic velocity of 59 cm/sec and a diastolic velocity of 10 cm/sec. The internal carotid artery has a normal arterial waveform with a peak systolic velocity of 73 cm/sec and a diastolic velocity of 5 cm/sec. The external carotid artery has a peak systolic velocity of 129 cm/sec. The vertebral artery has normal antegrade flow.    The following pertains to the left side of the neck. The common carotid artery has a normal arterial waveform with peak systolic velocity of 50 cm/sec and a diastolic velocity of 5 cm/sec. The internal carotid artery has a normal arterial waveform with a peak systolic velocity of 64 cm/sec and a diastolic velocity of 14 cm/sec. The external carotid artery has a peak systolic velocity of 120 cm/sec. The vertebral artery has normal antegrade flow.                               CT Head Without Contrast (Final result)  Result time 04/30/18 22:47:27    Final result by Srinivas Gallardo Jr., MD (04/30/18 22:47:27)                 Impression:     Subtle zone of hypodensity in the right corona radiata, questionable subacute lacunar infarct.  Involutional changes.  Microvascular ischemic changes.  Old lateral lacunar infarcts.    All CT scans at this facility use dose modulation, iterative reconstruction, and/or weight based dosing when appropriate to reduce radiation dose to as low as reasonably achievable.        Electronically signed by: SRINIVAS GALLARDO MD  Date:     04/30/18  Time:    22:47              Narrative:    Exam: CT HEAD WITHOUT CONTRAST    History:  Neuro deficit(s), subacute       Technique: Noncontrast axial images of the head were obtained.    Comparison:04/02/2003    Findings: Bilateral symmetric involutional changes.  Bilateral  microvascular ischemic change.      Small zone of hypodensity in the right corona radiata, measuring 1 cm, could correlate with a subacute ischemic lacunar infarct, images 17-18 series 2.  Does patient had a left-sided symptomatology to correlate?      Tiny old appearing lacunar infarcts elsewhere in the right corona radiata and basal ganglia and bilateral thalami.      Ventricular system is normal.  No hydrocephalus.  No midline shift.  No abnormal density to indicate acute major vascular distribution ischemic infarction or hemorrhage.  No mass effect.  No extra-axial fluid collections.     Visualized paranasal sinuses and mastoid air cells appear clear.      No calvarial fracture.    Vascular calcifications.                             X-Ray Chest AP Portable (Final result)  Result time 04/30/18 21:12:52    Final result by Srinivas Gallardo Jr., MD (04/30/18 21:12:52)                 Impression:          As above.      Electronically signed by: SRINIVAS GALLARDO MD  Date:     04/30/18  Time:    21:12              Narrative:    EXAM:   XR CHEST AP PORTABLE    CLINICAL HISTORY:   Weakness    COMPARISON:  01/19/2016    FINDINGS:   Heart size is normal. Benign linear streak in the right lung base likely some platelike atelectasis.  Linear-appearing marking and/or pleural thickening in the right midlung zone.  Some mild pulmonary edema is not excluded.  The dense alveolar infiltrates can be appreciated.  No large effusions.                                 Assessment/Plan:      * Cerebrovascular accident (CVA)    -  Patient with LUE and LLE weakness  - CT brain showing subacute infarct in R corona radiata, questionable subacute lacunar infarct.  Old lateral lacunar infarcts.   - Carotid US negative for significant stenosis  - Neuro checks q 4 hours  - MRI, ECHO, and Lipid panel pending  - Neuro consult pending  - Continue home ASA, Statin, and Plavix  - PT/OT/ST to eval and treat  - Control BP Goal <140/80  - Supportive  care  -  consult for DC planning          Hypokalemia    - KCL 3.5, Mag 1.6  - Replete today  - Daily BMP  - Monitor        Sleep apnea    - Patient reports h/o Sleep apnea and wears a CPAP at home  - CPAP q hs and while napping here  - Monitor          Type 2 diabetes mellitus    - Hold home meds  - A1c pending  - place on SSI Prn, poc glucose qac/qhs  - Monitor        BPH (benign prostatic hyperplasia)    - Stable  - Initial UA with >100 RBCs  - Repeat UA pending  - Patient denies hematuria  - Renal CT pending  - Continue home Finasteride, Tamsulosin  - monitor I/O          Essential hypertension    - BP under fair control  - Continue Amlodipine, Isordil, Lisinopril, and Metoprolol at home dose  - Monitor        CKD (chronic kidney disease), stage III    - Appears at baseline  - Monitor I/O  - Replete electrolytes prn  - Daily BMP          SOB (shortness of breath)    - CXR atelectasis, no significant acute findings  - No si/sx of infection  - Significant smoking hx  - Continue Duonebs q4h prn sob/wheezes, symbicort q12h  - BNP elevated to 2600  - Lasix 20 mg IVP x 1 today  - ECHO pending  - Supplemental O2 prn to KS >92%  - Monitor        Crohn's colitis    - Stable, no flare up  - Continue mesalamine at home dose          CAD (coronary artery disease)    - Asymptomatic  - Continue home ASA, Plavix, Statin  - Tele monitoring            VTE Risk Mitigation         Ordered     heparin (porcine) injection 5,000 Units  Every 8 hours      05/01/18 0004     IP VTE HIGH RISK PATIENT  Once      05/01/18 0004              Jaylen Alcantara NP  Department of Hospital Medicine   [facility]

## 2018-05-03 VITALS
RESPIRATION RATE: 18 BRPM | HEART RATE: 65 BPM | DIASTOLIC BLOOD PRESSURE: 74 MMHG | HEIGHT: 69 IN | OXYGEN SATURATION: 98 % | BODY MASS INDEX: 22.7 KG/M2 | SYSTOLIC BLOOD PRESSURE: 142 MMHG | WEIGHT: 153.25 LBS | TEMPERATURE: 99 F

## 2018-05-03 PROBLEM — R06.02 SOB (SHORTNESS OF BREATH): Status: RESOLVED | Noted: 2018-05-01 | Resolved: 2018-05-03

## 2018-05-03 PROBLEM — E87.6 HYPOKALEMIA: Status: RESOLVED | Noted: 2018-05-01 | Resolved: 2018-05-03

## 2018-05-03 LAB
ALBUMIN SERPL BCP-MCNC: 1.9 G/DL
ANION GAP SERPL CALC-SCNC: 6 MMOL/L
BASOPHILS # BLD AUTO: 0.05 K/UL
BASOPHILS NFR BLD: 0.7 %
BUN SERPL-MCNC: 28 MG/DL
CALCIUM SERPL-MCNC: 8.1 MG/DL
CHLORIDE SERPL-SCNC: 106 MMOL/L
CO2 SERPL-SCNC: 29 MMOL/L
CREAT SERPL-MCNC: 1.5 MG/DL
DIFFERENTIAL METHOD: ABNORMAL
EOSINOPHIL # BLD AUTO: 0.3 K/UL
EOSINOPHIL NFR BLD: 4.1 %
ERYTHROCYTE [DISTWIDTH] IN BLOOD BY AUTOMATED COUNT: 16.3 %
EST. GFR  (AFRICAN AMERICAN): 51 ML/MIN/1.73 M^2
EST. GFR  (NON AFRICAN AMERICAN): 44 ML/MIN/1.73 M^2
GLUCOSE SERPL-MCNC: 93 MG/DL
HCT VFR BLD AUTO: 27.5 %
HGB BLD-MCNC: 8.5 G/DL
LYMPHOCYTES # BLD AUTO: 1.3 K/UL
LYMPHOCYTES NFR BLD: 18.7 %
MAGNESIUM SERPL-MCNC: 1.7 MG/DL
MCH RBC QN AUTO: 25.1 PG
MCHC RBC AUTO-ENTMCNC: 30.9 G/DL
MCV RBC AUTO: 81 FL
MONOCYTES # BLD AUTO: 0.8 K/UL
MONOCYTES NFR BLD: 11.3 %
NEUTROPHILS # BLD AUTO: 4.5 K/UL
NEUTROPHILS NFR BLD: 65.2 %
PHOSPHATE SERPL-MCNC: 2.9 MG/DL
PHOSPHATE SERPL-MCNC: 2.9 MG/DL
PLATELET # BLD AUTO: 190 K/UL
PMV BLD AUTO: 10.4 FL
POCT GLUCOSE: 127 MG/DL (ref 70–110)
POTASSIUM SERPL-SCNC: 3.6 MMOL/L
RBC # BLD AUTO: 3.38 M/UL
SODIUM SERPL-SCNC: 141 MMOL/L
WBC # BLD AUTO: 6.89 K/UL

## 2018-05-03 PROCEDURE — 25000242 PHARM REV CODE 250 ALT 637 W/ HCPCS: Performed by: HOSPITALIST

## 2018-05-03 PROCEDURE — G8988 SELF CARE GOAL STATUS: HCPCS | Mod: CK

## 2018-05-03 PROCEDURE — 80069 RENAL FUNCTION PANEL: CPT

## 2018-05-03 PROCEDURE — 94640 AIRWAY INHALATION TREATMENT: CPT

## 2018-05-03 PROCEDURE — 25000003 PHARM REV CODE 250: Performed by: NURSE PRACTITIONER

## 2018-05-03 PROCEDURE — 97530 THERAPEUTIC ACTIVITIES: CPT

## 2018-05-03 PROCEDURE — 92507 TX SP LANG VOICE COMM INDIV: CPT

## 2018-05-03 PROCEDURE — 36415 COLL VENOUS BLD VENIPUNCTURE: CPT

## 2018-05-03 PROCEDURE — 25000003 PHARM REV CODE 250: Performed by: HOSPITALIST

## 2018-05-03 PROCEDURE — 97110 THERAPEUTIC EXERCISES: CPT

## 2018-05-03 PROCEDURE — 63600175 PHARM REV CODE 636 W HCPCS: Performed by: HOSPITALIST

## 2018-05-03 PROCEDURE — 27000221 HC OXYGEN, UP TO 24 HOURS

## 2018-05-03 PROCEDURE — G8978 MOBILITY CURRENT STATUS: HCPCS | Mod: CL

## 2018-05-03 PROCEDURE — 83735 ASSAY OF MAGNESIUM: CPT

## 2018-05-03 PROCEDURE — 94761 N-INVAS EAR/PLS OXIMETRY MLT: CPT

## 2018-05-03 PROCEDURE — G8979 MOBILITY GOAL STATUS: HCPCS | Mod: CJ

## 2018-05-03 PROCEDURE — 85025 COMPLETE CBC W/AUTO DIFF WBC: CPT

## 2018-05-03 PROCEDURE — G8987 SELF CARE CURRENT STATUS: HCPCS | Mod: CM

## 2018-05-03 RX ORDER — ASPIRIN 81 MG/1
81 TABLET ORAL DAILY
Refills: 0 | COMMUNITY
Start: 2018-05-04 | End: 2019-05-04

## 2018-05-03 RX ADMIN — HEPARIN SODIUM 5000 UNITS: 5000 INJECTION, SOLUTION INTRAVENOUS; SUBCUTANEOUS at 06:05

## 2018-05-03 RX ADMIN — AMLODIPINE BESYLATE 5 MG: 5 TABLET ORAL at 08:05

## 2018-05-03 RX ADMIN — TAMSULOSIN HYDROCHLORIDE 0.4 MG: 0.4 CAPSULE ORAL at 08:05

## 2018-05-03 RX ADMIN — ISOSORBIDE DINITRATE 20 MG: 10 TABLET ORAL at 08:05

## 2018-05-03 RX ADMIN — ATORVASTATIN CALCIUM 40 MG: 40 TABLET, FILM COATED ORAL at 08:05

## 2018-05-03 RX ADMIN — LISINOPRIL 40 MG: 20 TABLET ORAL at 08:05

## 2018-05-03 RX ADMIN — ASPIRIN 81 MG: 81 TABLET, COATED ORAL at 08:05

## 2018-05-03 RX ADMIN — FINASTERIDE 5 MG: 5 TABLET, FILM COATED ORAL at 08:05

## 2018-05-03 RX ADMIN — CLOPIDOGREL BISULFATE 75 MG: 75 TABLET ORAL at 08:05

## 2018-05-03 RX ADMIN — METOPROLOL SUCCINATE 12.5 MG: 25 TABLET, EXTENDED RELEASE ORAL at 08:05

## 2018-05-03 RX ADMIN — ARFORMOTEROL TARTRATE 15 MCG: 15 SOLUTION RESPIRATORY (INHALATION) at 07:05

## 2018-05-03 RX ADMIN — BUDESONIDE 0.25 MG: 0.25 SUSPENSION RESPIRATORY (INHALATION) at 07:05

## 2018-05-03 NOTE — PROGRESS NOTES
[facility] transporting pt to [facility] rehab at this time. Tele monitor removed, IV removed. All ppw faxed to facility.

## 2018-05-03 NOTE — PT/OT/SLP PROGRESS
Occupational Therapy  Treatment    Ryland Ortez Jr.   MRN: [id number]   Admitting Diagnosis: Cerebrovascular accident (CVA)    OT Date of Treatment: 05/03/18   OT Start Time: 1130  OT Stop Time: 1145  OT Total Time (min): 15 min    Billable Minutes:  Therapeutic Exercise 15    General Precautions: Standard, fall  Orthopedic Precautions: N/A  Braces: N/A         Subjective:  Communicated with RN prior to session.    Pain/Comfort  Pain Rating 1: 0/10  Pain Rating Post-Intervention 1: 0/10    Objective:  Patient found with: peripheral IV, telemetry     Functional Mobility:  Bed Mobility:       Transfers:        Functional Ambulation: Pt took 5 steps to transfer to bedside chair from EOB       Balance:   Static Sit: GOOD-: Takes MODERATE challenges from all directions but inconsistently  Dynamic Sit: FAIR+: Maintains balance through MINIMAL excursions of active trunk motion  Static Stand: 0: Needs MAXIMAL assist to maintain   Dynamic stand: 0: N/A    Therapeutic Activities and Exercises:  Pt performed bed mobility mod A. Pt required increased time to scoot to edge of bed SBA with mod v.c's. Pt stood max A x1 with RW, transferred to bedside chair max A x1 with RW, with max verbal cues for technique and safety. Seated in bedside chair pt performed AAROM to BUE's with sustained stretch in shldr flexion to decrease tightness and increase joint integrity. Pt left seated in bedside chair with lunch tray and call button within reach.    AM-PAC 6 CLICK ADL   How much help from another person does this patient currently need?   1 = Unable, Total/Dependent Assistance  2 = A lot, Maximum/Moderate Assistance  3 = A little, Minimum/Contact Guard/Supervision  4 = None, Modified [place]/Independent    Putting on and taking off regular lower body clothing? : 2  Bathing (including washing, rinsing, drying)?: 2  Toileting, which includes using toilet, bedpan, or urinal? : 2  Putting on and taking off regular upper body clothing?:  "2  Taking care of personal grooming such as brushing teeth?: 2  Eating meals?: 2  Total Score: 12     AM-PAC Raw Score CMS "G-Code Modifier Level of Impairment Assistance   6 % Total / Unable   7 - 8 CM 80 - 100% Maximal Assist   9-13 CL 60 - 80% Moderate Assist   14 - 19 CK 40 - 60% Moderate Assist   20 - 22 CJ 20 - 40% Minimal Assist   23 CI 1-20% SBA / CGA   24 CH 0% Independent/ Mod I       Patient left up in chair with all lines intact, call button in reach and nursing notified    ASSESSMENT:  Ryland Ortez Jr. is a 78 y.o. male with a medical diagnosis of Cerebrovascular accident (CVA) and presents with impaired ADLs, functional mobility, LUE function, coordination, sensation and safety awareness. Pt will benefit from continued skilled OT services to address deficits stated above.    Rehab identified problem list/impairments: Rehab identified problem list/impairments: weakness, impaired endurance, impaired sensation, impaired self care skills, impaired functional mobilty, decreased coordination, impaired balance, gait instability, decreased lower extremity function, decreased upper extremity function, decreased safety awareness, pain, impaired fine motor, impaired coordination, decreased ROM    Rehab potential is good.    Activity tolerance: Good    Discharge recommendations: Discharge Facility/Level Of Care Needs: rehabilitation facility     Barriers to discharge: Barriers to Discharge: None    Equipment recommendations: none     GOALS:    Occupational Therapy Goals        Problem: Occupational Therapy Goal    Goal Priority Disciplines Outcome Interventions   Occupational Therapy Goal     OT, PT/OT Ongoing (interventions implemented as appropriate)    Description:  Goals to be met by: 5-1-18      Patient will increase functional independence with ADLs by performing:    UE Dressing with Minimal Assistance.  LE Dressing with Minimal Assistance.  Toilet transfer to bedside commode with Minimal " Assistance.  Upper extremity exercise program x15  reps per handout, with min resistance             Problem: Occupational Therapy Goal    Goal Priority Disciplines Outcome Interventions   Occupational Therapy Goal     OT, PT/OT                     Plan:  Patient to be seen 3 x/week to address the above listed problems via self-care/home management, therapeutic activities, therapeutic exercises, neuromuscular re-education  Plan of Care expires: 05/08/18  Plan of Care reviewed with: patient    OT G-codes  Functional Assessment Tool Used: Ethel AM-PAC  Score: 12  Functional Limitation: Self care  Self Care Current Status (): CHEVY  Self Care Goal Status (): ALIZA Hook, JETHRO  05/03/2018

## 2018-05-03 NOTE — PLAN OF CARE
Problem: SLP Goal  Goal: SLP Goal  1. Pt will complete higher level cognitive tasks with 80% acc to increase problem solving, sequencing, safety awareness, and organization   Outcome: Ongoing (interventions implemented as appropriate)  Pt participated in higher level cognitive tasks with decreased memory and problem solving.  Pt to d/c to rehab today

## 2018-05-03 NOTE — PT/OT/SLP PROGRESS
Physical Therapy  Treatment    Rylnad Ortez Jr.   MRN: [id number]   Admitting Diagnosis: Cerebrovascular accident (CVA)    PT Received On: 05/03/18  PT Start Time: 1100     PT Stop Time: 1125    PT Total Time (min): 25 min       Billable Minutes:  Therapeutic Activity 15 and Therapeutic Exercise 10    Treatment Type: Treatment  PT/PTA: PT       General Precautions: Standard, fall  Orthopedic Precautions: N/A   Braces: N/A    Subjective:  Communicated with NURSE SANTIAGO prior to session.  Pain/Comfort  Pain Rating 1: 0/10    Objective:   Patient found with: telemetry    Functional Mobility:  Bed Mobility:   MODA FOR ROLLING AND SUP<>SIT TF    Transfers:  MAXA FOR SIT<>STAND TF AND BED TO CHAIR TF    Gait:   PT TOOK SEVERAL SMALL STEPS TO TF FROM BED TO CHAIR, MAXA USING RW, CUES FOR WT. SHIFTING IN ORDER TO TAKE STEPS, CUES FOR UPRIGHT POSTURE AND TO STAY ON TASK    Balance:   Static Sit: FAIR  Dynamic Sit: POOR+  Static Stand: POOR  Dynamic stand:  POOR    Therapeutic Activities and Exercises:  PT REQUIRED ZACHARY FOR FWD SEATED SCOOTING TO EOB, CUES TO STAY ON TASK, DIFFICULT DUE TO AIR SPECIALTY BED.  PT EDUCATED IN AND PERFORMED BLE THEREX X 20 REPS AROM WITH REST BREAKS DUE TO FATIGUE    AM-PAC 6 CLICK MOBILITY  How much help from another person does this patient currently need?   1 = Unable, Total/Dependent Assistance  2 = A lot, Maximum/Moderate Assistance  3 = A little, Minimum/Contact Guard/Supervision  4 = None, Modified [place]/Independent    Turning over in bed (including adjusting bedclothes, sheets and blankets)?: 2  Sitting down on and standing up from a chair with arms (e.g., wheelchair, bedside commode, etc.): 2  Moving from lying on back to sitting on the side of the bed?: 2  Moving to and from a bed to a chair (including a wheelchair)?: 2  Need to walk in hospital room?: 2  Climbing 3-5 steps with a railing?: 1  Total Score: 11    AM-PAC Raw Score CMS G-Code Modifier Level of Impairment  Assistance   6 % Total / Unable   7 - 9 CM 80 - 100% Maximal Assist   10 - 14 CL 60 - 80% Moderate Assist   15 - 19 CK 40 - 60% Moderate Assist   20 - 22 CJ 20 - 40% Minimal Assist   23 CI 1-20% SBA / CGA   24 CH 0% Independent/ Mod I     Patient left up in chair with all lines intact, call button in reach and NURSE notified.    Assessment:  Ryland Ortez Jr. is a 78 y.o. male with a medical diagnosis of Cerebrovascular accident (CVA) and presents with IMPAIRED FUNCTIONAL MOBILITY. PT WILL BENEFIT FROM CONT. SKILLED P.T. TO ADDRESS IMPAIRMENTS    Rehab identified problem list/impairments: Rehab identified problem list/impairments: weakness, impaired functional mobilty, gait instability, impaired balance, decreased coordination, decreased safety awareness    Rehab potential is good.    Activity tolerance: Good    Discharge recommendations: Discharge Facility/Level Of Care Needs: rehabilitation facility     Barriers to discharge:    Equipment recommendations: Equipment Needed After Discharge: none     GOALS:    Physical Therapy Goals        Problem: Physical Therapy Goal    Goal Priority Disciplines Outcome Goal Variances Interventions   Physical Therapy Goal     PT/OT, PT      Description:  LTG'S TO BE MET IN 7 DAYS (5-8-18)  1. PT WILL REQUIRE ZACHARY FOR BED MOBILITY  2. PT WILL REQUIRE MODA FOR TF'S  3. PT WILL AMB 50' WITH RW AND MODA  4. PT WILL TOLERATE BLE THEREX X 20 REPS AROM  5. PT WILL DEMO P+ DYNAMIC BALANCE DURING GAIT                  PLAN:    Patient to be seen 5 x/week  to address the above listed problems via gait training, therapeutic activities, therapeutic exercises  Plan of Care expires: 05/08/18  Plan of Care reviewed with: patient     PT ENCOURAGED TO CALL FOR ASSISTANCE WITH ALL NEEDS DUE TO FALL RISK STATUS, PT AGREEABLE    PT G-Codes  Functional Assessment Tool Used: BOSTON AMPAC  Score: 11  Functional Limitation: Mobility: Walking and moving around  Mobility: Walking and Moving Around  Current Status (): CL  Mobility: Walking and Moving Around Goal Status (): LISA Whittaker, PT  05/03/2018

## 2018-05-03 NOTE — PLAN OF CARE
Problem: Patient Care Overview  Goal: Plan of Care Review  Outcome: Ongoing (interventions implemented as appropriate)  MODA FOR BED MOBILITY, MAXA FOR TF'S

## 2018-05-03 NOTE — PLAN OF CARE
Problem: Occupational Therapy Goal  Goal: Occupational Therapy Goal  Goals to be met by: 5-1-18      Patient will increase functional independence with ADLs by performing:    UE Dressing with Minimal Assistance.  LE Dressing with Minimal Assistance.  Toilet transfer to bedside commode with Minimal Assistance.  Upper extremity exercise program x15  reps per handout, with min resistance     Outcome: Ongoing (interventions implemented as appropriate)  Pt performed bed mobility mod A. Pt required increased time to scoot to edge of bed SBA with mod v.c's. Pt stood max A x1 with RW, transferred to bedside chair max A x1 with RW, with max verbal cues for technique and safety. Seated in bedside chair pt performed AAROM to BUE's with sustained stretch in shldr flexion to decrease tightness and increase joint integrity. Pt left seated in bedside chair with lunch tray and call button within reach.

## 2018-05-03 NOTE — PLAN OF CARE
Spoke with Yissel at [facility] this morning who states that they will be ready to accept patient for inpatient rehab at their facility today and requests that we plan for a discharge transport time today at [facility] at 1:00 pm.      Notified NP for hospitalist who wrote D/C Orders.  At 1000 today, faxed completed [facility] Form to [facility]; confirmed that [facility] received it and that they will  patient at [facility] at 1300 today and will transport him from [facility] to [facility].      Faxed D/C Orders and AVS to [facility] via [employer] to complete inpatient rehab referral to [facility].  [facility]'s phone number ([phone]) to call report and notified her of above.    At 1015, met with patient at bedside (and patient conferenced wife via phone) and explained above.                   05/03/18 1155   Final Note   Assessment Type Final Discharge Note   Discharge Disposition Rehab  ([facility])   What phone number can be called within the next 1-3 days to see how you are doing after discharge? [phone]   Right Care Referral Info   Post Acute Recommendation IRF   Referral Type PT/OT/ST   Facility Name [facility]   [address] [place] [address]

## 2018-05-03 NOTE — PT/OT/SLP PROGRESS
Speech Language Pathology Treatment    Patient Name:  Ryland Ortez Jr.   MRN:  [id number]  Admitting Diagnosis: Cerebrovascular accident (CVA)    Recommendations:                 General Recommendations:  Cognitive-linguistic therapy  Diet recommendations:  Regular, Liquid Diet Level: Thin   Aspiration Precautions: HOB to 90 degrees and Remain upright 30 minutes post meal   General Precautions: Standard, fall  Communication strategies:  provide increased time to answer    Subjective     Pt was seen at bedside with no family present.    Patient goals: none reported     Pain/Comfort:  · Pain Rating 1: 0/10    Objective:     Has the patient been evaluated by SLP for swallowing?   Yes  Keep patient NPO? No   Current Respiratory Status: room air      Pt completed memory tasks with mod cueing required, problem solving tasks with delayed initiation and mod cueing, sequencing tasks with min cueing, and safety awareness tasks with mod cueing.  Pt required moderate encouragement for physical movement.      Assessment:     Ryland Ortez Jr. is a 78 y.o. male with an SLP diagnosis of Cognitive-Linguistic Impairment.  He presents with decreased motivation and initiation.  He will benefit from continued ST for stated deficits.      Goals:    SLP Goals        Problem: SLP Goal    Goal Priority Disciplines Outcome   SLP Goal     SLP Ongoing (interventions implemented as appropriate)   Description:  1. Pt will complete higher level cognitive tasks with 80% acc to increase problem solving, sequencing, safety awareness, and organization                    Plan:     · Patient to be seen:  3 x/week   · Plan of Care expires:  05/09/18  · Plan of Care reviewed with:  patient   · SLP Follow-Up:  Yes       Discharge recommendations:  rehabilitation facility   Barriers to Discharge:  None    Time Tracking:     SLP Treatment Date:   05/03/18  Speech Start Time:  1100  Speech Stop Time:  1125     Speech Total Time (min):  25 min    Billable  Minutes: Speech Therapy Individual 25    Celi Davis CCC-SLP  05/03/2018

## 2018-05-03 NOTE — PLAN OF CARE
Problem: Patient Care Overview  Goal: Plan of Care Review  Outcome: Ongoing (interventions implemented as appropriate)  Patient stable. Plan of care reviewed. Patient verbalizes understanding. Patient on turn bed. Incontinence care performed. Bed low, wheels locked, bed alarm on, call light in reach. Patient instructed to call for assistance. Will continue to monitor.

## 2018-05-03 NOTE — DISCHARGE SUMMARY
"[facility] Hospital Medicine  Discharge Summary      Patient Name: Ryland Ortez Jr.  MRN: [id number]  Admission Date: 4/30/2018  Hospital Length of Stay: 2 days  Discharge Date and Time:  05/03/2018 4:40 PM  Attending Physician: No att. providers found   Discharging Provider: Jaylen Alcantara NP  Primary Care Provider: Janina Emerson MD      HPI:   78M h/o CVA, COPD, HTN, PAD, CAD, Crohn's disease, and CKD stage III presents with L sided weakness x 1 week.  Reports "stiffiness".  Associated with mechanical falls x 3 and imbalance.  Prior to 1 week ago, patient was able to ambulate with walker without instability.   Also c/o SOB with exertion.  Patient is a former smoker.  He quit in 2008.  Was a 40 pack year smoker.   [facility]  Chart review does not provide further significant history pertaining to HPI.  In ER, CT brain show subacute infarct of right corona radiata, with questionable subacute lacunar infarct, old lacunar infarcts are seen.  Hospital medicine called for admission.          * No surgery found *      Hospital Course:   On 4/30 patient was admitted to [facility] secondary to Subacute CVA.  CT head showed Subtle zone of hypodensity in the right corona radiata, questionable subacute lacunar infarct.  Involutional changes.  Microvascular ischemic changes.  Old lateral lacunar infarcts.  No evidence of hemorrhage.  Carotid US showed No clinically significant area of stenosis.  There is a mild amount of atherosclerosis bilaterally.  MRI, ECHO, and Lipid panel pending.  Neuro consult pending.  PT/OT/ST evals pending.  Per d/w PT today, patient will need inpatient rehab.  SW consulted to assist with placement.  Initial UA showed >100 RBCs.  Patient denies hematuria and all bladder complaints.  Renal CT and repeat UA pending.  Patient reports taking ASA 81 mg daily, Plavix 75 mg daily and statin therapy, which have been resumed.  Periods of apnea noted while sleeping; " patient reports he uses a CPAP at night for h/o sleep apnea, will order for use here q hs and while napping.  5/2/18 No acute issues overnight. Neuro following. The patient will need rehab placement at discharge. Case management is aware. 5/3/18 No acute issues overnight. The patient was approved to go to [facility] for continued PT/OT/ST. The patient was seen and examined today and deemed stable for discharge. The patient will follow up with his PCP and with Neurology. The patient may need nursing home placement after rehab.       Consults:   Consults         Status Ordering Provider     Inpatient consult to Neurology  Once     Provider:  Efe Rodriguez MD    Completed SIMONA ARMENDARIZ     Inpatient consult to Registered Dietitian/Nutritionist  Once     Provider:  (Not yet assigned)    Completed ERNST FLORES     Inpatient consult to Social Work  Once     Provider:  (Not yet assigned)    Completed SIMONA ARMENDARIZ          No new Assessment & Plan notes have been filed under this hospital service since the last note was generated.  Service: Hospital Medicine    Final Active Diagnoses:    Diagnosis Date Noted POA    PRINCIPAL PROBLEM:  Cerebrovascular accident (CVA) [I63.9] 05/01/2018 Yes    CKD (chronic kidney disease), stage III [N18.3] 05/01/2018 Yes    Essential hypertension [I10] 05/01/2018 Yes    BPH (benign prostatic hyperplasia) [N40.0] 05/01/2018 Yes    Type 2 diabetes mellitus [E11.9] 05/01/2018 Yes    Sleep apnea [G47.30] 05/01/2018 Unknown    Moderate malnutrition [E44.0] 05/01/2018 Yes    Stage 3 pressure ulcer with suspected deep tissue injury [L89.93] 05/01/2018 Yes    Pressure ulcer of sacral region, stage 3 [L89.153] 05/01/2018 Yes    Pressure ulcer of left heel, stage 1 [L89.621] 05/01/2018 Yes    CAD (coronary artery disease) [I25.10] 07/08/2013 Yes      Problems Resolved During this Admission:    Diagnosis Date Noted Date Resolved POA    SOB  (shortness of breath) [R06.02] 05/01/2018 05/03/2018 Yes    Hypokalemia [E87.6] 05/01/2018 05/03/2018 Unknown    Crohn's colitis [K50.10] 04/21/2015 05/03/2018 Yes       Discharged Condition: stable    Disposition: Rehab Facility    Follow Up:  Follow-up Information     Janina Emerson MD In 3 days.    Specialty:  Nephrology  Contact information:  [address] [facility] [address] [phone]             Efe Rodriguez MD. Schedule an appointment as soon as possible for a visit in 2 weeks.    Specialty:  Neurology  Contact information:  [address]  [place] [address] [phone]             [facility].    Why:  Rehab:  Inpatient rehab for PT/OT/ST  Contact information:  [address] [facility] [address]               Patient Instructions:     Diet Cardiac     Activity as tolerated         Significant Diagnostic Studies:   Imaging Results          MRI Brain (Stroke Protocol) Without Contrast (Final result)  Result time 05/01/18 14:05:43    Final result by Mal Saba MD (05/01/18 14:05:43)                 Impression:      Extensive small vessel disease.  Multiple old-appearing deep white matter lacunar infarcts.  Bilateral hippocampal cysts.  No definite acute intracranial abnormality.      Electronically signed by: Mal Saba MD  Date:    05/01/2018  Time:    14:05             Narrative:    EXAMINATION:  MRI BRAIN (STROKE PROTOCOL) WITHOUT CONTRAST    CLINICAL HISTORY:  Stroke;.    TECHNIQUE:  Multiplanar multisequence MR imaging of the brain was performed without contrast.    COMPARISON:  None    FINDINGS:  Extensive increased signal in the deep white matter and subcortical locations of both cerebral hemispheres consistent with small vessel disease.  Multiple small old-appearing lacunar infarcts.  No diffusion-weighted sequence abnormality identified to suspect acute ischemic changes.    Mild diffuse cerebral  atrophy with mild enlargement of the lateral ventricles and enlargement of cortical sulci.  No evidence of intra or extra-axial hemorrhage.    Normal vascular flow voids are preserved.    Sinuses are clear.    Small amount of fluid in the mastoid air cells can be seen greater on the left.                               US Carotid Bilateral (Final result)  Result time 05/01/18 10:04:33    Final result by CESAR Aparicio Sr., MD (05/01/18 10:04:33)                 Impression:      1. No clinically significant area of stenosis.  2. There is a mild amount of atherosclerosis bilaterally.  Validated velocity measurements with angiographic measurements, velocity criteria are extrapolated from diameter data as defined by the Society of Radiologists in Ultrasound Consensus Conference      Electronically signed by: Ward Aparicio MD  Date:    05/01/2018  Time:    10:04             Narrative:    EXAMINATION:  US CAROTID BILATERAL    CLINICAL HISTORY:  cva;    TECHNIQUE:  Multiple static ultrasound images are submitted for interpretation with color flow and spectral Doppler imaging.    COMPARISON:  None    FINDINGS:  There is a moderate amount of atherosclerosis bilaterally.    The following pertains to the right side of the neck. The common carotid artery has a normal arterial waveform with peak systolic velocity of 59 cm/sec and a diastolic velocity of 10 cm/sec. The internal carotid artery has a normal arterial waveform with a peak systolic velocity of 73 cm/sec and a diastolic velocity of 5 cm/sec. The external carotid artery has a peak systolic velocity of 129 cm/sec. The vertebral artery has normal antegrade flow.    The following pertains to the left side of the neck. The common carotid artery has a normal arterial waveform with peak systolic velocity of 50 cm/sec and a diastolic velocity of 5 cm/sec. The internal carotid artery has a normal arterial waveform with a peak systolic velocity of 64 cm/sec and a diastolic  velocity of 14 cm/sec. The external carotid artery has a peak systolic velocity of 120 cm/sec. The vertebral artery has normal antegrade flow.                               CT Head Without Contrast (Final result)  Result time 04/30/18 22:47:27    Final result by Srinivas Gallardo Jr., MD (04/30/18 22:47:27)                 Impression:     Subtle zone of hypodensity in the right corona radiata, questionable subacute lacunar infarct.  Involutional changes.  Microvascular ischemic changes.  Old lateral lacunar infarcts.    All CT scans at this facility use dose modulation, iterative reconstruction, and/or weight based dosing when appropriate to reduce radiation dose to as low as reasonably achievable.        Electronically signed by: SRINIVAS GALLARDO MD  Date:     04/30/18  Time:    22:47              Narrative:    Exam: CT HEAD WITHOUT CONTRAST    History:  Neuro deficit(s), subacute       Technique: Noncontrast axial images of the head were obtained.    Comparison:04/02/2003    Findings: Bilateral symmetric involutional changes.  Bilateral microvascular ischemic change.      Small zone of hypodensity in the right corona radiata, measuring 1 cm, could correlate with a subacute ischemic lacunar infarct, images 17-18 series 2.  Does patient had a left-sided symptomatology to correlate?      Tiny old appearing lacunar infarcts elsewhere in the right corona radiata and basal ganglia and bilateral thalami.      Ventricular system is normal.  No hydrocephalus.  No midline shift.  No abnormal density to indicate acute major vascular distribution ischemic infarction or hemorrhage.  No mass effect.  No extra-axial fluid collections.     Visualized paranasal sinuses and mastoid air cells appear clear.      No calvarial fracture.    Vascular calcifications.                             X-Ray Chest AP Portable (Final result)  Result time 04/30/18 21:12:52    Final result by Srinivas Gallardo Jr., MD (04/30/18 21:12:52)                  Impression:          As above.      Electronically signed by: KATIE HODGES MD  Date:     04/30/18  Time:    21:12              Narrative:    EXAM:   XR CHEST AP PORTABLE    CLINICAL HISTORY:   Weakness    COMPARISON:  01/19/2016    FINDINGS:   Heart size is normal. Benign linear streak in the right lung base likely some platelike atelectasis.  Linear-appearing marking and/or pleural thickening in the right midlung zone.  Some mild pulmonary edema is not excluded.  The dense alveolar infiltrates can be appreciated.  No large effusions.                                 Pending Diagnostic Studies:     None         Medications:  Reconciled Home Medications:      Medication List      START taking these medications    aspirin 81 MG EC tablet  Commonly known as:  ECOTRIN  Take 1 tablet (81 mg total) by mouth once daily.  Start taking on:  5/4/2018        CONTINUE taking these medications    adalimumab 40 mg/0.8 mL Sykt injection  Commonly known as:  HUMIRA  Inject 0.8 mLs (40 mg total) into the skin every 7 days.     amLODIPine 5 MG tablet  Commonly known as:  NORVASC  Take 5 mg by mouth once daily.     atorvastatin 10 MG tablet  Commonly known as:  LIPITOR  Take 1 tablet (10 mg total) by mouth once daily.     clopidogrel 75 mg tablet  Commonly known as:  PLAVIX  Take 75 mg by mouth once daily.     clotrimazole 1 % cream  Commonly known as:  LOTRIMIN  Apply to affected area 2 times daily     ferrous sulfate 325 mg (65 mg iron) Tab tablet  Take 1 tablet (325 mg total) by mouth once daily.     finasteride 5 mg tablet  Commonly known as:  PROSCAR  Take 1 tablet (5 mg total) by mouth once daily.     glipiZIDE 5 MG tablet  Commonly known as:  GLUCOTROL  Take 5 mg by mouth daily with dinner or evening meal. Pt takes 2.5 mg at night     isosorbide dinitrate 40 MG Tab  Commonly known as:  ISORDIL  Take 20 mg by mouth 3 (three) times daily.     lisinopril 40 MG tablet  Commonly known as:  PRINIVIL,ZESTRIL  Take 40 mg by mouth  once daily.     mesalamine 1000 MG Supp  Commonly known as:  CANASA  Place 500 mg rectally 2 (two) times daily.     metoprolol succinate 25 MG 24 hr tablet  Commonly known as:  TOPROL-XL  Take 0.5 tablets (12.5 mg total) by mouth once daily.     oxyCODONE-acetaminophen 5-325 mg per tablet  Commonly known as:  PERCOCET  Take 1 tablet by mouth every 4 (four) hours as needed for Pain.     pantoprazole 40 MG tablet  Commonly known as:  PROTONIX  Take 1 tablet (40 mg total) by mouth once daily.     tamsulosin 0.4 mg Cp24  Commonly known as:  FLOMAX  Take 1 capsule (0.4 mg total) by mouth once daily.            Indwelling Lines/Drains at time of discharge:   Lines/Drains/Airways     Pressure Ulcer                 Pressure Injury 05/01/18 0200 Left posterior Thigh Stage 3 2 days         Pressure Injury 05/01/18 0200 midline Sacral spine Stage 3 2 days         Pressure Injury 05/01/18 Left Heel Stage 1 2 days                Time spent on the discharge of patient: > 30 minutes  Patient was seen and examined on the date of discharge and determined to be suitable for discharge.         Jaylen Alcantara NP  Department of Hospital Medicine  [facility] -

## 2018-05-03 NOTE — PROGRESS NOTES
Called report to Judi at [facility] rehab. Verified with CM who state they faxed all discharge ppw. No hard scripts that need to be sent. [facility] to pick pt up at 1300. Will continue to monitor.